# Patient Record
Sex: FEMALE | Race: BLACK OR AFRICAN AMERICAN | NOT HISPANIC OR LATINO | Employment: FULL TIME | URBAN - METROPOLITAN AREA
[De-identification: names, ages, dates, MRNs, and addresses within clinical notes are randomized per-mention and may not be internally consistent; named-entity substitution may affect disease eponyms.]

---

## 2020-02-03 ENCOUNTER — ULTRASOUND (OUTPATIENT)
Dept: OBGYN CLINIC | Age: 30
End: 2020-02-03
Payer: COMMERCIAL

## 2020-02-03 VITALS
BODY MASS INDEX: 32.58 KG/M2 | SYSTOLIC BLOOD PRESSURE: 108 MMHG | HEIGHT: 64 IN | DIASTOLIC BLOOD PRESSURE: 80 MMHG | WEIGHT: 190.8 LBS

## 2020-02-03 DIAGNOSIS — Z3A.17 17 WEEKS GESTATION OF PREGNANCY: ICD-10-CM

## 2020-02-03 DIAGNOSIS — N91.2 AMENORRHEA: Primary | ICD-10-CM

## 2020-02-03 DIAGNOSIS — Z34.90 PREGNANCY, UNSPECIFIED GESTATIONAL AGE: ICD-10-CM

## 2020-02-03 PROCEDURE — 76817 TRANSVAGINAL US OBSTETRIC: CPT | Performed by: NURSE PRACTITIONER

## 2020-02-03 NOTE — PATIENT INSTRUCTIONS
First Trimester Pregnancy   WHAT YOU NEED TO KNOW:   The first trimester of pregnancy lasts from your last period through the 13th week of pregnancy  Follow up with your healthcare provider for prenatal care  Regular prenatal care can help to keep you and your baby healthy  DISCHARGE INSTRUCTIONS:   Return to the emergency department if:   · You have pain or cramping in your abdomen or low back  · You have severe vaginal bleeding or clotting  Contact your healthcare provider or obstetrician if:   · You have light bleeding  · You have chills or a fever  · You have vaginal itching, burning, or pain  · You have yellow, green, white, or foul-smelling vaginal discharge  · You have pain or burning when you urinate, less urine than usual, or pink or bloody urine  · You have questions or concerns about your condition or care  Follow up with your healthcare provider or obstetrician as directed:  Go to all of your prenatal visits during your pregnancy  Write down your questions so you remember to ask them during your visits  Stay healthy during pregnancy:   · Take prenatal vitamins as directed  Prenatal vitamins can help you get the amount of vitamins and minerals you need during pregnancy  Prenatal vitamins may also decrease the risk of certain birth defects  · Eat a variety of healthy foods  Healthy foods include fruits, vegetables, whole-grain breads, low-fat dairy products, beans, turkey and chicken, and lean red meat  Ask your healthcare provider for more information about foods that are healthy and safe to eat during pregnancy  · Drink liquids as directed  Ask how much liquid to drink each day and which liquids are best for you  Some healthy liquids include milk, water, and juice  It is not clear how caffeine affects pregnancy  Limit your intake of caffeine to less than 200 mg each day to avoid possible health problems   Caffeine may be found in coffee, tea, cola, sports drinks, and chocolate  Do not drink alcohol  · Talk to your healthcare provider before you take medicines  Many medicines can harm your baby, especially during early pregnancy  Ask your healthcare provider before you take any medicines, including over-the-counter medicines, vitamins, herbs, or food supplements  Never use illegal or street drugs while you are pregnant  Talk to your healthcare provider if you are having trouble quitting street drugs  · Exercise  Ask your healthcare provider about the best exercise plan for you  Exercise may help you feel better and make your labor and delivery easier  · Do not smoke  Smoking can cause problems during pregnancy  It can also cause your baby to weigh less at birth  If you smoke, it is never too late to quit  Ask for information if you need help quitting  Safety tips:   · Do not use a hot tub or sauna  while you are pregnant, especially during your first trimester  Hot tubs and saunas may raise your baby's temperature and increase the risk of birth defects  It also increases your risk of miscarriage  · Protect yourself from illness  Toxoplasmosis is a disease that can cause birth defects  To protect yourself from this disease, do not clean your cat's litter box yourself  Ask someone else to clean your cat's litter box while you are pregnant  Also, do not  eat raw meat or unwashed fruits and vegetables  Wash your hands after you touch raw meat, and eat only well-cooked meat  Wash fruits and vegetables well before you eat them  © 2017 [address] [employer] [employer] Information is for End User's use only and may not be sold, redistributed or otherwise used for commercial purposes  All illustrations and images included in CareNotes® are the copyrighted property of A D A M , Inc  or Mik Hughes  The above information is an  only  It is not intended as medical advice for individual conditions or treatments   Talk to your doctor, nurse or pharmacist before following any medical regimen to see if it is safe and effective for you

## 2020-02-03 NOTE — PROGRESS NOTES
Technician: Study performed by the interpreting Certified Nurse Practitioner    Indications:  amenorrhea       Prior to use, disinfection was performed with [employer]infection Process  Probe Serial Number#  [id number])                                     LMP (after multiple dates given) last FULL period 10/5/19   (17weeks 2 days gestational age based on dates)             Procedure Details  A viable elizabeth fetus and placenta  Fetal cardiac activity is present @ 146 BPM   Patient verbalizes that she feels +FM already as well  Findings:   Viable, elizabeth intrauterine pregnancy at 17 weeks,  2 days, (no change to BRITTNY)  with a calculated BRITTNY of 2020 based on LMP  Patient instructed that she needs to go to [facility] to have formal ultrasound for proper second trimester measurements  Referral given and appt scheduled  Plan to RTO for OB Intake after ultrasound is completed first   Start PNV

## 2020-02-04 ENCOUNTER — ULTRASOUND (OUTPATIENT)
Dept: PERINATAL CARE | Facility: OTHER | Age: 30
End: 2020-02-04
Payer: COMMERCIAL

## 2020-02-04 VITALS
BODY MASS INDEX: 32.71 KG/M2 | SYSTOLIC BLOOD PRESSURE: 97 MMHG | DIASTOLIC BLOOD PRESSURE: 67 MMHG | HEIGHT: 64 IN | HEART RATE: 85 BPM | WEIGHT: 191.6 LBS

## 2020-02-04 DIAGNOSIS — Z3A.17 17 WEEKS GESTATION OF PREGNANCY: ICD-10-CM

## 2020-02-04 DIAGNOSIS — Z3A.16 16 WEEKS GESTATION OF PREGNANCY: ICD-10-CM

## 2020-02-04 DIAGNOSIS — Z36.89 ENCOUNTER FOR ULTRASOUND TO ASSESS FETAL GROWTH: Primary | ICD-10-CM

## 2020-02-04 PROBLEM — O99.210 OBESITY AFFECTING PREGNANCY: Status: ACTIVE | Noted: 2020-02-04

## 2020-02-04 PROBLEM — G43.909 MIGRAINES: Status: ACTIVE | Noted: 2020-02-04

## 2020-02-04 PROBLEM — Z87.59 HISTORY OF PRIOR PREGNANCY WITH SGA NEWBORN: Status: ACTIVE | Noted: 2020-02-04

## 2020-02-04 PROCEDURE — 76816 OB US FOLLOW-UP PER FETUS: CPT | Performed by: OBSTETRICS & GYNECOLOGY

## 2020-02-04 PROCEDURE — 99242 OFF/OP CONSLTJ NEW/EST SF 20: CPT | Performed by: OBSTETRICS & GYNECOLOGY

## 2020-02-04 RX ORDER — ASPIRIN 81 MG/1
162 TABLET, CHEWABLE ORAL DAILY
Qty: 60 TABLET | Refills: 3 | Status: SHIPPED | OUTPATIENT
Start: 2020-02-04 | End: 2020-07-09 | Stop reason: HOSPADM

## 2020-02-04 NOTE — PROGRESS NOTES
CONSULTATION: MATERNAL-FETAL MEDICINE    Dear Srini Deluca  [address],    Thank you very much for your kind referral of patient Marykay Babinski for Maternal-Fetal Medicine consultation  As you know, Ms Leopold Grippe is a 31y o  year-old [id number] at 16w4d presenting for consultation for dating ultrasound, obesity, and history of small for gestational age infant  She has reports intermittent headaches, but otherwise has no complaints       Her Antepartum course is significant for:   Patient Active Problem List   Diagnosis    History of prior pregnancy with SGA     Migraines    16 weeks gestation of pregnancy    Obesity affecting pregnancy       Obstetric History:  OB History    Para Term  AB Living   10 4 4   4 4   SAB TAB Ectopic Multiple Live Births     4            # Outcome Date GA Lbr Shay/2nd Weight Sex Delivery Anes PTL Lv   10 Current            9             8 TAB            7 TAB            6 TAB            5 TAB            4 Term            3 Term            2 Term            1 Term               Obstetric Comments   4    4 Terminations       Past Medical History:  Past Medical History:   Diagnosis Date    Migraine     Obesity        Past Surgical History:  History reviewed  No pertinent surgical history  Social History:   She denies current use of alcohol, drugs of abuse, tobacco, and marijuana products  She is a former smoker  Family History:  Family history was reviewed using an office screening tool, and is negative for congenital anomalies, genetic diseases, and thromboembolism in first degree relatives of this pregnancy  Family history is notable for diabetes and hypertension on her side of the family      Medications:    Current Outpatient Medications:     aspirin 81 mg chewable tablet, Chew 2 tablets (162 mg total) daily, Disp: 60 tablet, Rfl: 3    Allergies:  No Known Allergies    Review of Systems:  Pertinent items are noted in HPI  Exam:  Vitals: Blood pressure 97/67, pulse 85, height 5' 4" (1 626 m), weight 86 9 kg (191 lb 9 6 oz), last menstrual period 10/05/2019  General appearance: alert, well appearing, and in no distress  The remainder of her physical examination was deferred as she was here today for consultation and discussion  Ultrasound findings today are as follows: Her last menstrual period is uncertain  On today's ultrasound, a viable elizabeth intrauterine pregnancy is seen, measuring 16w4d, establishing an EDC of 2020  The patient was advised of her EDC, and medical record was updated  Uterus, cervix, adnexa, and cul-de-sac appear within normal limits  My recommendations are as follows:     Obesity affecting pregnancy  Obesity in pregnancy (defined as body mass index > 30 kg/m2) is associated with an increased risk of several pregnancy complications, including hypertensive disorders, diabetes, abnormal fetal growth, fetal malformations  The risk of  delivery is also increased, as are wound complications in the event of  delivery  A healthy diet and exercise, as well as appropriate gestational weight gain (no more than 11-20 pounds) can help reduce risk of these complications  150 minutes of moderate exercise per week is recommended for all pregnant women  Nutrition counseling is also available if desired  Early screening for gestational diabetes is recommended, as well as routine re-screening at 24-28 weeks if early screening results are normal   fetal surveillance is also recommended as follows:  BMI 30-40:  Evaluation of fetal growth at 32 weeks gestation  BMI >40: Evaluation of fetal growth at 28, 34 weeks gestation, as well as antepartum fetal surveillance once weekly beginning at 36 weeks gestation      History of prior pregnancy with SGA   Given history of small for gestational age (SGA)  I recommend a third trimester growth ultrasound at 30-32 weeks gestation  16 weeks gestation of pregnancy  I recommend that she return at for detailed anatomic survey as well as transvaginal cervical length screening 20 weeks gestation  She was advised of EDC established by today's ultrasound  We reviewed genetic screening and testing options and she would like to pursue a quad screen, which I instructed her to speak with her OB to have ordered  She did not wish to pursue amniocentesis at this time  The use of low dose aspirin in pregnancy (81-162mg) is recommended in women with a high risk, or multiple moderate risk factors for preeclampsia  Aspirin therapy should be initiated between 12-28 weeks gestation, and is most effective if started prior to 16 weeks gestation, and continued until delivery  Low dose aspirin in pregnancy has been shown to reduce the incidence of preeclampsia in women with risk factors, and has been shown to be safe and without significant maternal or fetal risk  In light of her risk factors which include obesity and  ethnicity, I recommend initiating aspirin therapy  She has an OB visit to follow and I encouraged her to discuss management of headache at her prenatal visit  We briefly reviewed over-the-counter treatment options, and importance of avoiding NSAIDs in pregnancy  Evaluation and Management:  The patient was counseled regarding the above findings  The limitations of ultrasound were reviewed  The approximate face-to-face time was 30 minutes  The majority of time (>50%) was spent counseling and/or coordinating care with the patient and/or family members  At the conclusion of today's encounter, all questions were answered to her satisfaction  Thank you very much for this kind referral and please do not hesitate to contact me with any further questions or concerns      Sincerely,    Mikael Boyle MD  Attending Physician, [address] [place] [address] [facility]

## 2020-02-04 NOTE — ASSESSMENT & PLAN NOTE
Obesity in pregnancy (defined as body mass index > 30 kg/m2) is associated with an increased risk of several pregnancy complications, including hypertensive disorders, diabetes, abnormal fetal growth, fetal malformations  The risk of  delivery is also increased, as are wound complications in the event of  delivery  A healthy diet and exercise, as well as appropriate gestational weight gain (no more than 11-20 pounds) can help reduce risk of these complications  150 minutes of moderate exercise per week is recommended for all pregnant women  Nutrition counseling is also available if desired  Early screening for gestational diabetes is recommended, as well as routine re-screening at 24-28 weeks if early screening results are normal   fetal surveillance is also recommended as follows:  BMI 30-40:  Evaluation of fetal growth at 32 weeks gestation  BMI >40: Evaluation of fetal growth at 28, 34 weeks gestation, as well as antepartum fetal surveillance once weekly beginning at 36 weeks gestation

## 2020-02-04 NOTE — LETTER
2020     Silas Robles, [address] [facility] [address]    Patient: Baldomero Meza   YOB: 1990   Date of Visit: 2020       Dear Dr Gorman Daughters: Thank you for referring Baldomero Meza to me for evaluation  Below are my notes for this consultation  If you have questions, please do not hesitate to call me  I look forward to following your patient along with you  Sincerely,        Yaw Britton MD        CC: No Recipients  Yaw Britton MD  2020  3:45 PM  Sign at close encounter  CONSULTATION: MATERNAL-FETAL MEDICINE    Dear Silas Robles, Srini  [address],    Thank you very much for your kind referral of patient Baldomero Meza for Maternal-Fetal Medicine consultation  As you know, Ms Mariana Gill is a 31y o  year-old [id number] at 16w4d presenting for consultation for dating ultrasound, obesity, and history of small for gestational age infant  She has reports intermittent headaches, but otherwise has no complaints       Her Antepartum course is significant for:   Patient Active Problem List   Diagnosis    History of prior pregnancy with SGA     Migraines    16 weeks gestation of pregnancy    Obesity affecting pregnancy       Obstetric History:  OB History    Para Term  AB Living   10 4 4   4 4   SAB TAB Ectopic Multiple Live Births     4            # Outcome Date GA Lbr Shay/2nd Weight Sex Delivery Anes PTL Lv   10 Current            9             8 TAB            7 TAB            6 TAB            5 TAB            4 Term            3 Term            2 Term            1 Term               Obstetric Comments   4    4 Terminations       Past Medical History:  Past Medical History:   Diagnosis Date    Migraine     Obesity        Past Surgical History:  History reviewed  No pertinent surgical history      Social History:   She denies current use of alcohol, drugs of abuse, tobacco, and marijuana products  She is a former smoker  Family History:  Family history was reviewed using an office screening tool, and is negative for congenital anomalies, genetic diseases, and thromboembolism in first degree relatives of this pregnancy  Family history is notable for diabetes and hypertension on her side of the family  Medications:    Current Outpatient Medications:     aspirin 81 mg chewable tablet, Chew 2 tablets (162 mg total) daily, Disp: 60 tablet, Rfl: 3    Allergies:  No Known Allergies    Review of Systems:  Pertinent items are noted in HPI  Exam:  Vitals: Blood pressure 97/67, pulse 85, height 5' 4" (1 626 m), weight 86 9 kg (191 lb 9 6 oz), last menstrual period 10/05/2019  General appearance: alert, well appearing, and in no distress  The remainder of her physical examination was deferred as she was here today for consultation and discussion  Ultrasound findings today are as follows: Her last menstrual period is uncertain  On today's ultrasound, a viable elizabeth intrauterine pregnancy is seen, measuring 16w4d, establishing an EDC of 2020  The patient was advised of her EDC, and medical record was updated  Uterus, cervix, adnexa, and cul-de-sac appear within normal limits  My recommendations are as follows:     Obesity affecting pregnancy  Obesity in pregnancy (defined as body mass index > 30 kg/m2) is associated with an increased risk of several pregnancy complications, including hypertensive disorders, diabetes, abnormal fetal growth, fetal malformations  The risk of  delivery is also increased, as are wound complications in the event of  delivery  A healthy diet and exercise, as well as appropriate gestational weight gain (no more than 11-20 pounds) can help reduce risk of these complications  150 minutes of moderate exercise per week is recommended for all pregnant women  Nutrition counseling is also available if desired  Early screening for gestational diabetes is recommended, as well as routine re-screening at 24-28 weeks if early screening results are normal   fetal surveillance is also recommended as follows:  BMI 30-40:  Evaluation of fetal growth at 32 weeks gestation  BMI >40: Evaluation of fetal growth at 28, 34 weeks gestation, as well as antepartum fetal surveillance once weekly beginning at 36 weeks gestation  History of prior pregnancy with SGA   Given history of small for gestational age (SGA)  I recommend a third trimester growth ultrasound at 30-32 weeks gestation  16 weeks gestation of pregnancy  I recommend that she return at for detailed anatomic survey as well as transvaginal cervical length screening 20 weeks gestation  She was advised of EDC established by today's ultrasound  We reviewed genetic screening and testing options and she would like to pursue a quad screen, which I instructed her to speak with her OB to have ordered  She did not wish to pursue amniocentesis at this time  The use of low dose aspirin in pregnancy (81-162mg) is recommended in women with a high risk, or multiple moderate risk factors for preeclampsia  Aspirin therapy should be initiated between 12-28 weeks gestation, and is most effective if started prior to 16 weeks gestation, and continued until delivery  Low dose aspirin in pregnancy has been shown to reduce the incidence of preeclampsia in women with risk factors, and has been shown to be safe and without significant maternal or fetal risk  In light of her risk factors which include obesity and  ethnicity, I recommend initiating aspirin therapy  She has an OB visit to follow and I encouraged her to discuss management of headache at her prenatal visit  We briefly reviewed over-the-counter treatment options, and importance of avoiding NSAIDs in pregnancy         Evaluation and Management:  The patient was counseled regarding the above findings  The limitations of ultrasound were reviewed  The approximate face-to-face time was 30 minutes  The majority of time (>50%) was spent counseling and/or coordinating care with the patient and/or family members  At the conclusion of today's encounter, all questions were answered to her satisfaction  Thank you very much for this kind referral and please do not hesitate to contact me with any further questions or concerns      Sincerely,    Anupam Faustin MD  Attending Physician, [place]

## 2020-02-04 NOTE — ASSESSMENT & PLAN NOTE
I recommend that she return at for detailed anatomic survey as well as transvaginal cervical length screening 20 weeks gestation  She was advised of EDC established by today's ultrasound  We reviewed genetic screening and testing options and she would like to pursue a quad screen, which I instructed her to speak with her OB to have ordered  She did not wish to pursue amniocentesis at this time  The use of low dose aspirin in pregnancy (81-162mg) is recommended in women with a high risk, or multiple moderate risk factors for preeclampsia  Aspirin therapy should be initiated between 12-28 weeks gestation, and is most effective if started prior to 16 weeks gestation, and continued until delivery  Low dose aspirin in pregnancy has been shown to reduce the incidence of preeclampsia in women with risk factors, and has been shown to be safe and without significant maternal or fetal risk  In light of her risk factors which include obesity and  ethnicity, I recommend initiating aspirin therapy  She has an OB visit to follow and I encouraged her to discuss management of headache at her prenatal visit  We briefly reviewed over-the-counter treatment options, and importance of avoiding NSAIDs in pregnancy

## 2020-02-04 NOTE — PATIENT INSTRUCTIONS
The use of low dose aspirin in pregnancy (162mg, which is 2 tabs of baby aspirin) is recommended in women with a high risk, or multiple moderate risk factors for preeclampsia  Aspirin therapy should be initiated between 12-28 weeks gestation, and is most effective if started prior to 16 weeks gestation, and continued until delivery  Low dose aspirin in pregnancy has been shown to reduce the incidence of preeclampsia in women with risk factors, and has been shown to be safe and without significant maternal or fetal risk  In light of your risk factors for preeclampsia, including: Body Mass Index 30 or greater and  Race I recommend initiating aspirin therapy, which was prescribed today

## 2020-02-04 NOTE — ASSESSMENT & PLAN NOTE
Given history of small for gestational age (SGA)  I recommend a third trimester growth ultrasound at 30-32 weeks gestation

## 2020-03-03 PROBLEM — Z3A.20 20 WEEKS GESTATION OF PREGNANCY: Status: ACTIVE | Noted: 2020-02-04

## 2020-06-04 ENCOUNTER — OB ABSTRACT (OUTPATIENT)
Dept: OBGYN CLINIC | Facility: CLINIC | Age: 30
End: 2020-06-04

## 2020-06-04 ENCOUNTER — TELEPHONE (OUTPATIENT)
Dept: OBGYN CLINIC | Facility: CLINIC | Age: 30
End: 2020-06-04

## 2020-06-12 ENCOUNTER — TELEPHONE (OUTPATIENT)
Dept: OBGYN CLINIC | Facility: CLINIC | Age: 30
End: 2020-06-12

## 2020-06-19 ENCOUNTER — TELEPHONE (OUTPATIENT)
Dept: OBGYN CLINIC | Facility: CLINIC | Age: 30
End: 2020-06-19

## 2020-06-19 ENCOUNTER — INITIAL PRENATAL (OUTPATIENT)
Dept: OBGYN CLINIC | Facility: CLINIC | Age: 30
End: 2020-06-19

## 2020-06-19 ENCOUNTER — TELEPHONE (OUTPATIENT)
Dept: PERINATAL CARE | Facility: CLINIC | Age: 30
End: 2020-06-19

## 2020-06-19 ENCOUNTER — DOCUMENTATION (OUTPATIENT)
Dept: OBGYN CLINIC | Facility: CLINIC | Age: 30
End: 2020-06-19

## 2020-06-19 VITALS
WEIGHT: 184.8 LBS | BODY MASS INDEX: 32.74 KG/M2 | RESPIRATION RATE: 18 BRPM | DIASTOLIC BLOOD PRESSURE: 72 MMHG | HEIGHT: 63 IN | HEART RATE: 74 BPM | SYSTOLIC BLOOD PRESSURE: 122 MMHG

## 2020-06-19 DIAGNOSIS — Z33.1 INCIDENTAL PREGNANCY: Primary | ICD-10-CM

## 2020-06-19 PROCEDURE — OBC

## 2020-06-22 ENCOUNTER — TELEPHONE (OUTPATIENT)
Dept: PERINATAL CARE | Facility: CLINIC | Age: 30
End: 2020-06-22

## 2020-06-24 ENCOUNTER — OB ABSTRACT (OUTPATIENT)
Dept: OBGYN CLINIC | Facility: CLINIC | Age: 30
End: 2020-06-24

## 2020-07-01 ENCOUNTER — TELEPHONE (OUTPATIENT)
Dept: PERINATAL CARE | Facility: CLINIC | Age: 30
End: 2020-07-01

## 2020-07-01 ENCOUNTER — HOSPITAL ENCOUNTER (OUTPATIENT)
Facility: HOSPITAL | Age: 30
Discharge: HOME | End: 2020-07-02
Attending: OBSTETRICS & GYNECOLOGY | Admitting: OBSTETRICS & GYNECOLOGY
Payer: COMMERCIAL

## 2020-07-01 VITALS
RESPIRATION RATE: 18 BRPM | HEART RATE: 93 BPM | DIASTOLIC BLOOD PRESSURE: 70 MMHG | TEMPERATURE: 98.4 F | SYSTOLIC BLOOD PRESSURE: 118 MMHG

## 2020-07-01 PROBLEM — Z3A.38 38 WEEKS GESTATION OF PREGNANCY: Status: ACTIVE | Noted: 2020-02-04

## 2020-07-01 PROBLEM — Z3A.37 PREGNANCY WITH 37 WEEKS COMPLETED GESTATION: Status: ACTIVE | Noted: 2020-07-01

## 2020-07-01 PROCEDURE — 87340 HEPATITIS B SURFACE AG IA: CPT | Performed by: STUDENT IN AN ORGANIZED HEALTH CARE EDUCATION/TRAINING PROGRAM

## 2020-07-01 PROCEDURE — 86592 SYPHILIS TEST NON-TREP QUAL: CPT | Performed by: STUDENT IN AN ORGANIZED HEALTH CARE EDUCATION/TRAINING PROGRAM

## 2020-07-01 PROCEDURE — 86762 RUBELLA ANTIBODY: CPT | Performed by: STUDENT IN AN ORGANIZED HEALTH CARE EDUCATION/TRAINING PROGRAM

## 2020-07-01 PROCEDURE — 99213 OFFICE O/P EST LOW 20 MIN: CPT | Performed by: OBSTETRICS & GYNECOLOGY

## 2020-07-01 PROCEDURE — 87389 HIV-1 AG W/HIV-1&-2 AB AG IA: CPT | Performed by: STUDENT IN AN ORGANIZED HEALTH CARE EDUCATION/TRAINING PROGRAM

## 2020-07-01 PROCEDURE — 87086 URINE CULTURE/COLONY COUNT: CPT | Performed by: STUDENT IN AN ORGANIZED HEALTH CARE EDUCATION/TRAINING PROGRAM

## 2020-07-01 PROCEDURE — 85025 COMPLETE CBC W/AUTO DIFF WBC: CPT | Performed by: STUDENT IN AN ORGANIZED HEALTH CARE EDUCATION/TRAINING PROGRAM

## 2020-07-01 PROCEDURE — 81001 URINALYSIS AUTO W/SCOPE: CPT | Performed by: STUDENT IN AN ORGANIZED HEALTH CARE EDUCATION/TRAINING PROGRAM

## 2020-07-01 NOTE — TELEPHONE ENCOUNTER
-------------------------------------------------------------    Attempted to reach patient by phone and left voicemail to confirm appointment for MFM ultrasound  1 support person ( must be over the age of 15) may accompany you for your appointment  You must wear a mask (covering nose and mouth) during your visit  You and your support person will be screened upon arrival   IF not feeling well- cough, fever, shortness of breath or any flu like symptoms contact your primary care physician or [address] [place] [address] [place]  Please call our office prior to entering the building  Check in and rooming questions will be done via phone  Inside office # provided:  [place] line: [phone]  [place] line:  [phone]  [place] line:  [address] line:  [phone]  [place] line:  [phone]  [place] line:  [phone]    [facility] does not allow cell phone use, recording device or streaming during ultrasound     Any questions with these instructions please call Maternal Fetal Medicine nurse line today @ # [phone]

## 2020-07-02 LAB
BACTERIA UR QL AUTO: ABNORMAL /HPF
BASOPHILS # BLD AUTO: 0.01 THOUSANDS/ΜL (ref 0–0.1)
BASOPHILS NFR BLD AUTO: 0 % (ref 0–1)
BILIRUB UR QL STRIP: NEGATIVE
CLARITY UR: CLEAR
COLOR UR: YELLOW
EOSINOPHIL # BLD AUTO: 0.02 THOUSAND/ΜL (ref 0–0.61)
EOSINOPHIL NFR BLD AUTO: 0 % (ref 0–6)
ERYTHROCYTE [DISTWIDTH] IN BLOOD BY AUTOMATED COUNT: 15.8 % (ref 11.6–15.1)
GLUCOSE UR STRIP-MCNC: NEGATIVE MG/DL
HBV SURFACE AG SER QL: NORMAL
HCT VFR BLD AUTO: 26.9 % (ref 34.8–46.1)
HGB BLD-MCNC: 8.6 G/DL (ref 11.5–15.4)
HGB UR QL STRIP.AUTO: ABNORMAL
IMM GRANULOCYTES # BLD AUTO: 0.01 THOUSAND/UL (ref 0–0.2)
IMM GRANULOCYTES NFR BLD AUTO: 0 % (ref 0–2)
KETONES UR STRIP-MCNC: NEGATIVE MG/DL
LEUKOCYTE ESTERASE UR QL STRIP: ABNORMAL
LYMPHOCYTES # BLD AUTO: 1.93 THOUSANDS/ΜL (ref 0.6–4.47)
LYMPHOCYTES NFR BLD AUTO: 43 % (ref 14–44)
MCH RBC QN AUTO: 26.9 PG (ref 26.8–34.3)
MCHC RBC AUTO-ENTMCNC: 32 G/DL (ref 31.4–37.4)
MCV RBC AUTO: 84 FL (ref 82–98)
MONOCYTES # BLD AUTO: 0.32 THOUSAND/ΜL (ref 0.17–1.22)
MONOCYTES NFR BLD AUTO: 7 % (ref 4–12)
MUCOUS THREADS UR QL AUTO: ABNORMAL
NEUTROPHILS # BLD AUTO: 2.23 THOUSANDS/ΜL (ref 1.85–7.62)
NEUTS SEG NFR BLD AUTO: 50 % (ref 43–75)
NITRITE UR QL STRIP: NEGATIVE
NON-SQ EPI CELLS URNS QL MICRO: ABNORMAL /HPF
NRBC BLD AUTO-RTO: 0 /100 WBCS
PH UR STRIP.AUTO: 8 [PH]
PLATELET # BLD AUTO: 180 THOUSANDS/UL (ref 149–390)
PMV BLD AUTO: 10.8 FL (ref 8.9–12.7)
PROT UR STRIP-MCNC: NEGATIVE MG/DL
RBC # BLD AUTO: 3.2 MILLION/UL (ref 3.81–5.12)
RBC #/AREA URNS AUTO: ABNORMAL /HPF
RPR SER QL: NORMAL
RUBV IGG SERPL IA-ACNC: 12.9 IU/ML
SP GR UR STRIP.AUTO: 1.02 (ref 1–1.03)
UROBILINOGEN UR QL STRIP.AUTO: 1 E.U./DL
WBC # BLD AUTO: 4.52 THOUSAND/UL (ref 4.31–10.16)
WBC #/AREA URNS AUTO: ABNORMAL /HPF

## 2020-07-02 PROCEDURE — 99213 OFFICE O/P EST LOW 20 MIN: CPT

## 2020-07-02 NOTE — PROGRESS NOTES
L&D Triage Note - OB/GYN  Adele Bush 31 y o  female MRN: [id number]  Unit/Bed#: LD TRIAGE  Encounter: [id number]    Patient is seen by Dr Gladys Holguin    ASSESSMENT:  Adele Bush is a 31 y o  [id number] at 37w5d who presents with abd cramping and leakage of fluid  PLAN:  1  R/o labor  - SVE 0/ 50% / -4  - [place] - Irregular ctx's with irritability    2  R/o rupture  - Speculum exam showing normal white discharge, no pooling  - Nitrazine (-)  - Dry slide with no ferning  - NISA 15 vtx    3  37 weeks gestation  - Poor PNC to this point - cites COVID pandemic as the reason  - Will draw prenatal panel now  - Scheduled for US tomorrow at PN center  - Encouraged to follow up with Dr Clau Crawford office in the next week for PN visit  - Needs GBS collected    Disposition  - Labor/ROM ruled out  - Discharge from [facility] triage with term labor precautions  Reviewed rupture of membranes, false vs true labor, decreased fetal movement, and vaginal bleeding  - Pt to call provider with any concerns and follow up at her next scheduled prenatal appointment   - Continue routine prenatal care   - Case discussed with Dr Cj Ramirez:  Adele Bush is a 31 y o  [id number] at 37w5d with an Estimated Date of Delivery: 20 here today for complaints of loss of fluid  She reports this morning at around 8am she felt gush of clear odorless fluid when sitting on toilet  She states around this time she began feeling 8/10 abd cramping in her lower abdomen/back every 20-30 min  Contractions: Yes - reported as above  Leakage of fluid: yes - reported as above  Vaginal Bleeding: no  Fetal movement: present    Her obstetrical history is significant for:  - 4 prior term , uncomplicated pregnancies   4 prior TAB    OBJECTIVE:  Vitals:    20 2203   BP: 118/70   Pulse: 93   Resp: 18   Temp: 98 4 °F (36 9 °C)       ROS:  Constitutional: Negative for fever, chills  Respiratory: Negative for cough, SOB  Cardiovascular: Negative for chest pain    Gastrointestinal: Negative for nausea/vomiting    General Physical Exam:  General: NAD, cooperative, pleasant, comfortable  Cardiovascular: regular rate, normotensive  Lungs: non-labored breathing  Abdomen: Soft, nontender  Gravid uterus  Lower extremeties: nontender    Cervical Exam  Speculum: Cervical os is closed on visualization  No evidence of bleeding or pooling of fluid  Normal physiologic discharge present  SVE: 0 / 50% / -4    Fetal monitoring:  FHT:  135 bpm/ Moderate 6 - 25 bpm / accelerations present, no decelerations  [place]: irregular with irritability     Membrane status   - No ferning   - Negative nitrazene   - No pooling     Imaging:   Abd  US   NISA - performed by Dr Zena Camacho     - Total: 15 cm    Toni Rivera DO  PGY-1, [facility]  7/1/2020 10:15 PM     Patient seen and examined with resident agree

## 2020-07-03 LAB
BACTERIA UR CULT: ABNORMAL
BACTERIA UR CULT: ABNORMAL
HIV 1+2 AB+HIV1 P24 AG SERPL QL IA: NORMAL

## 2020-07-07 ENCOUNTER — HOSPITAL ENCOUNTER (INPATIENT)
Facility: HOSPITAL | Age: 30
LOS: 2 days | Discharge: HOME | End: 2020-07-09
Attending: OBSTETRICS & GYNECOLOGY | Admitting: OBSTETRICS & GYNECOLOGY
Payer: COMMERCIAL

## 2020-07-07 DIAGNOSIS — Z3A.38 38 WEEKS GESTATION OF PREGNANCY: Primary | ICD-10-CM

## 2020-07-07 LAB
ABO GROUP BLD: NORMAL
ABO GROUP BLD: NORMAL
AMPHETAMINES SERPL QL SCN: POSITIVE
BARBITURATES UR QL: NEGATIVE
BASE EXCESS BLDCOA CALC-SCNC: -4.1 MMOL/L (ref 3–11)
BASE EXCESS BLDCOV CALC-SCNC: -3.8 MMOL/L (ref 1–9)
BENZODIAZ UR QL: NEGATIVE
BLD GP AB SCN SERPL QL: NEGATIVE
COCAINE UR QL: NEGATIVE
ERYTHROCYTE [DISTWIDTH] IN BLOOD BY AUTOMATED COUNT: 16.2 % (ref 11.6–15.1)
EXTERNAL GROUP B STREP ANTIGEN: NORMAL
HCO3 BLDCOA-SCNC: 21.9 MMOL/L (ref 17.3–27.3)
HCO3 BLDCOV-SCNC: 21.6 MMOL/L (ref 12.2–28.6)
HCT VFR BLD AUTO: 29.9 % (ref 34.8–46.1)
HGB BLD-MCNC: 9.7 G/DL (ref 11.5–15.4)
MCH RBC QN AUTO: 26.9 PG (ref 26.8–34.3)
MCHC RBC AUTO-ENTMCNC: 32.4 G/DL (ref 31.4–37.4)
MCV RBC AUTO: 83 FL (ref 82–98)
METHADONE UR QL: NEGATIVE
O2 CT VFR BLDCOA CALC: 2.5 ML/DL
OPIATES UR QL SCN: NEGATIVE
OXYCODONE+OXYMORPHONE UR QL SCN: NEGATIVE
OXYHGB MFR BLDCOA: 15.7 %
OXYHGB MFR BLDCOV: 22.5 %
PCO2 BLDCOA: 43.4 MM[HG] (ref 30–60)
PCO2 BLDCOV: 40.4 MM HG (ref 27–43)
PCP UR QL: NEGATIVE
PH BLDCOA: 7.32 [PH] (ref 7.23–7.43)
PH BLDCOV: 7.35 [PH] (ref 7.19–7.49)
PLATELET # BLD AUTO: 195 THOUSANDS/UL (ref 149–390)
PMV BLD AUTO: 10.7 FL (ref 8.9–12.7)
PO2 BLDCOA: 11.7 MM HG (ref 5–25)
PO2 BLDCOV: 12.3 MM HG (ref 15–45)
RBC # BLD AUTO: 3.6 MILLION/UL (ref 3.81–5.12)
RH BLD: POSITIVE
RH BLD: POSITIVE
SAO2 % BLDCOV: 3.7 ML/DL
SPECIMEN EXPIRATION DATE: NORMAL
THC UR QL: NEGATIVE
WBC # BLD AUTO: 5.61 THOUSAND/UL (ref 4.31–10.16)

## 2020-07-07 PROCEDURE — 86850 RBC ANTIBODY SCREEN: CPT | Performed by: OBSTETRICS & GYNECOLOGY

## 2020-07-07 PROCEDURE — 86592 SYPHILIS TEST NON-TREP QUAL: CPT | Performed by: OBSTETRICS & GYNECOLOGY

## 2020-07-07 PROCEDURE — 4A1HXCZ MONITORING OF PRODUCTS OF CONCEPTION, CARDIAC RATE, EXTERNAL APPROACH: ICD-10-PCS | Performed by: OBSTETRICS & GYNECOLOGY

## 2020-07-07 PROCEDURE — 88307 TISSUE EXAM BY PATHOLOGIST: CPT | Performed by: PATHOLOGY

## 2020-07-07 PROCEDURE — 59410 OBSTETRICAL CARE: CPT | Performed by: OBSTETRICS & GYNECOLOGY

## 2020-07-07 PROCEDURE — 82805 BLOOD GASES W/O2 SATURATION: CPT | Performed by: OBSTETRICS & GYNECOLOGY

## 2020-07-07 PROCEDURE — 86901 BLOOD TYPING SEROLOGIC RH(D): CPT | Performed by: OBSTETRICS & GYNECOLOGY

## 2020-07-07 PROCEDURE — 85027 COMPLETE CBC AUTOMATED: CPT | Performed by: OBSTETRICS & GYNECOLOGY

## 2020-07-07 PROCEDURE — 99024 POSTOP FOLLOW-UP VISIT: CPT | Performed by: OBSTETRICS & GYNECOLOGY

## 2020-07-07 PROCEDURE — 86900 BLOOD TYPING SEROLOGIC ABO: CPT | Performed by: OBSTETRICS & GYNECOLOGY

## 2020-07-07 PROCEDURE — 80307 DRUG TEST PRSMV CHEM ANLYZR: CPT | Performed by: OBSTETRICS & GYNECOLOGY

## 2020-07-07 RX ORDER — ONDANSETRON 2 MG/ML
4 INJECTION INTRAMUSCULAR; INTRAVENOUS EVERY 8 HOURS PRN
Status: DISCONTINUED | OUTPATIENT
Start: 2020-07-07 | End: 2020-07-09 | Stop reason: HOSPADM

## 2020-07-07 RX ORDER — OXYTOCIN 10 [USP'U]/ML
10 INJECTION, SOLUTION INTRAMUSCULAR; INTRAVENOUS ONCE
Status: COMPLETED | OUTPATIENT
Start: 2020-07-07 | End: 2020-07-07

## 2020-07-07 RX ORDER — DIAPER,BRIEF,INFANT-TODD,DISP
1 EACH MISCELLANEOUS 4 TIMES DAILY PRN
Status: DISCONTINUED | OUTPATIENT
Start: 2020-07-07 | End: 2020-07-09 | Stop reason: HOSPADM

## 2020-07-07 RX ORDER — OXYTOCIN 10 [USP'U]/ML
INJECTION, SOLUTION INTRAMUSCULAR; INTRAVENOUS
Status: COMPLETED
Start: 2020-07-07 | End: 2020-07-07

## 2020-07-07 RX ORDER — DIPHENHYDRAMINE HCL 25 MG
25 TABLET ORAL EVERY 6 HOURS PRN
Status: DISCONTINUED | OUTPATIENT
Start: 2020-07-07 | End: 2020-07-09 | Stop reason: HOSPADM

## 2020-07-07 RX ORDER — IBUPROFEN 600 MG/1
600 TABLET ORAL EVERY 6 HOURS PRN
Status: DISCONTINUED | OUTPATIENT
Start: 2020-07-07 | End: 2020-07-09 | Stop reason: HOSPADM

## 2020-07-07 RX ORDER — ONDANSETRON 2 MG/ML
4 INJECTION INTRAMUSCULAR; INTRAVENOUS EVERY 6 HOURS PRN
Status: DISCONTINUED | OUTPATIENT
Start: 2020-07-07 | End: 2020-07-07

## 2020-07-07 RX ORDER — DOCUSATE SODIUM 100 MG/1
100 CAPSULE, LIQUID FILLED ORAL 2 TIMES DAILY
Status: DISCONTINUED | OUTPATIENT
Start: 2020-07-07 | End: 2020-07-09 | Stop reason: HOSPADM

## 2020-07-07 RX ORDER — OXYTOCIN/RINGER'S LACTATE 30/500 ML
62.5 PLASTIC BAG, INJECTION (ML) INTRAVENOUS
Status: DISCONTINUED | OUTPATIENT
Start: 2020-07-07 | End: 2020-07-07

## 2020-07-07 RX ORDER — IBUPROFEN 600 MG/1
600 TABLET ORAL EVERY 6 HOURS PRN
Status: DISCONTINUED | OUTPATIENT
Start: 2020-07-07 | End: 2020-07-07

## 2020-07-07 RX ORDER — ACETAMINOPHEN 325 MG/1
650 TABLET ORAL EVERY 4 HOURS PRN
Status: DISCONTINUED | OUTPATIENT
Start: 2020-07-07 | End: 2020-07-09 | Stop reason: HOSPADM

## 2020-07-07 RX ORDER — CALCIUM CARBONATE 200(500)MG
1000 TABLET,CHEWABLE ORAL DAILY PRN
Status: DISCONTINUED | OUTPATIENT
Start: 2020-07-07 | End: 2020-07-09 | Stop reason: HOSPADM

## 2020-07-07 RX ADMIN — IBUPROFEN 600 MG: 600 TABLET ORAL at 07:46

## 2020-07-07 RX ADMIN — IBUPROFEN 600 MG: 600 TABLET ORAL at 15:23

## 2020-07-07 RX ADMIN — OXYTOCIN 10 UNITS: 10 INJECTION, SOLUTION INTRAMUSCULAR; INTRAVENOUS at 06:52

## 2020-07-07 RX ADMIN — IBUPROFEN 600 MG: 600 TABLET ORAL at 22:32

## 2020-07-07 RX ADMIN — DOCUSATE SODIUM 100 MG: 100 CAPSULE, LIQUID FILLED ORAL at 17:47

## 2020-07-07 NOTE — L&D DELIVERY NOTE
Delivery Summary - OB/GYN   Laural Sida 31 y o  female MRN: [id number]  Unit/Bed#: -01 Encounter: [id number]    Pre-delivery Diagnosis:   1  38w4d pregnancy  2  Poor prenatal care    Post-delivery Diagnosis: same, delivered    Attending: Natalie Deleon MD    Assistant(s): Ivelisse BAIG    Procedure:     Anesthesia: None    EBL: 350 cc    Specimens:   1  Arterial and venous cord gases  2  Cord blood  3  Segment of umbilical cord  4  Placenta to pathology secondary to thick meconium    Complications:  None apparent    Findings:  1  Viable male  delivered on 20 at 55222 weighing 6lbs 6oz;  Apgar scores of 9 at one minute and 9 at five minutes  2  Spontaneous delivery of placenta with centrally inserted 3-vessel cord  3  Arterial and venous cord gas of 7320 and 7 346, respectively         Disposition: Patient tolerated the procedure well and was recovering in labor and delivery room with family and  before being transferred to the post-partum floor  Procedure Details     Description of procedure     On 20 at 0648 patient delivered a viable male , weighing 2915g, Apgars of 9 (1 min) and 9  (5 min)  The fetal vertex delivered spontaneously  There was no nuchal cord  The anterior shoulder delivered atraumatically with maternal expulsive forces and the assistance of downward traction  The posterior shoulder delivered with maternal expulsive forces and the assistance of upward traction  The remainder of the fetus delivered spontaneously  Upon delivery, the infant was placed on the mothers abdomen and the cord was clamped and cut  The infant was noted to cry spontaneously and was moving all extremities appropriately  There was no evidence for injury  Awaiting nurse resuscitators evaluated the  at bedside  Arterial and venous cord blood gases and cord blood was collected for analysis  These were promptly sent to the lab   In the immediate post-partum, 30 units of IV pitocin was administered and the uterus was noted to contract down well with massage and pitocin  The placenta delivered spontaneously at 3684 and was noted to have a centrally inserted 3 vessel cord  The vagina, cervix, and perineum were inspected and there was noted to be no lacerations  At the conclusion of the delivery, all needle, sponge, and instrument counts were noted to be correct  Patient tolerated the procedure well and was allowed to recover in labor and delivery room with family and  before being transferred to the post-partum floor  Dr Hermes Brizuela was present and participated in all key portions of the case      [place], DO

## 2020-07-07 NOTE — LACTATION NOTE
This note was copied from a baby's chart  CONSULT - LACTATION  Baby Girl Mik Peace) Rawles 0 days female MRN: [id number]    [address] [place] [address] Room / Bed: (N)/(N) Encounter: [id number]    Maternal Information     MOTHER:  Dwaine Shore  Maternal Age: 31 y o    OB History: #: 1, Date: 2011, Sex: None, Weight: None, GA: 20w0d, Delivery: None, Apgar1: None, Apgar5: None, Living: None, Birth Comments: None    #: 2, Date: 03/12/12, Sex: Female, Weight: 2268 g (5 lb), GA: 40w0d, Delivery: Vaginal, Spontaneous, Apgar1: None, Apgar5: None, Living: Living, Birth Comments: None    #: 3, Date: 10/27/14, Sex: Female, Weight: 2722 g (6 lb), GA: 40w0d, Delivery: Vaginal, Spontaneous, Apgar1: None, Apgar5: None, Living: Living, Birth Comments: induced  due to ovarian cyst    #: 4, Date: 02/29/16, Sex: Male, Weight: 3175 g (7 lb), GA: 40w0d, Delivery: Vaginal, Spontaneous, Apgar1: None, Apgar5: None, Living: Living, Birth Comments: None    #: 5, Date: 04/25/17, Sex: Female, Weight: 3629 g (8 lb), GA: 40w0d, Delivery: Vaginal, Spontaneous, Apgar1: None, Apgar5: None, Living: Living, Birth Comments: None    #: 6, Date: 02/2018, Sex: None, Weight: None, GA: 6w0d, Delivery: None, Apgar1: None, Apgar5: None, Living: None, Birth Comments: None    #: 7, Date: 06/2018, Sex: None, Weight: None, GA: 6w0d, Delivery: None, Apgar1: None, Apgar5: None, Living: None, Birth Comments: None    #: 8, Date: 2019, Sex: None, Weight: None, GA: 6w0d, Delivery: None, Apgar1: None, Apgar5: None, Living: None, Birth Comments: None    #: 9, Date: 07/07/20, Sex: Female, Weight: 2915 g (6 lb 6 8 oz), GA: 38w4d, Delivery: Vaginal, Spontaneous, Apgar1: 9, Apgar5: 9, Living: Living, Birth Comments: None   Previouse breast reduction surgery?  No    Lactation history:   Has patient previously breast fed: Yes   How long had patient previously breast fed: BF Hx 1st one year, 2nd three years, 3rd 6 months, 4th 3-4 months Previous breast feeding complications:       Past Surgical History:   Procedure Laterality Date    WISDOM TOOTH EXTRACTION         Birth information:  YOB: 2018   Time of birth: 6:48 AM   Sex: female   Delivery type: Vaginal, Spontaneous   Birth Weight: 2915 g (6 lb 6 8 oz)   Percent of Weight Change: 0%     Gestational Age: 38w3d   [unfilled]    Assessment     Breast and nipple assessment: normal assessment     Assessment: normal assessment    Feeding assessment: feeding well    Feeding recommendations:  pump every 2-3 hours feed infant formula for now  +UDS Patient instructed to pump and discard milk until further evaluation done  Information given on Methamphetamines being a drug class L5 which is hazardous  Patient says she took only tylenol, Excedrin for migraines and Zyrtec for allergies  She denies any ADD/ADHD medications or decongestants  Instructions given on pumping  Discussed when to start, frequency, different pumps available versus manual expression  Discussed hygiene of hands and supplies as well as assembly, placement of flanges, size of flanged, preparing the breast and cycles and suction settings on pump  Demonstrated use of hand pump  Discussed labeling of milk, storage, and preparation of stored milk  Encouraged parents to call for assistance, questions, and concerns about breastfeeding  Extension provided      Marce Booth RN 2020 7:58 PM

## 2020-07-07 NOTE — DISCHARGE INSTRUCTIONS
Vaginal Delivery   WHAT YOU NEED TO KNOW:   A vaginal delivery occurs when your baby is born through your vagina (birth canal)  DISCHARGE INSTRUCTIONS:   Seek care immediately if:   · Your leg feels warm, tender, and painful  It may look swollen and red  · You have a fever  · You are urinating very little, or not at all  · You have heavy vaginal bleeding that fills 1 or more sanitary pads in 1 hour  · You feel weak, dizzy, or faint  Contact your healthcare provider if:   · Your abdominal or perineal pain does not go away, or gets worse  · You feel depressed  · You have questions or concerns about your condition or care  Medicines:  · NSAIDs , such as ibuprofen, help decrease swelling, pain, and fever  This medicine is available with or without a doctor's order  NSAIDs can cause stomach bleeding or kidney problems in certain people  If you take blood thinner medicine, always ask your healthcare provider if NSAIDs are safe for you  Always read the medicine label and follow directions  · Stool softeners  make it easier for you to have a bowel movement  You may need this medicine to treat or prevent constipation  · Take your medicine as directed  Contact your healthcare provider if you think your medicine is not helping or if you have side effects  Tell him or her if you are allergic to any medicine  Keep a list of the medicines, vitamins, and herbs you take  Include the amounts, and when and why you take them  Bring the list or the pill bottles to follow-up visits  Carry your medicine list with you in case of an emergency  Follow up with your healthcare provider:  Most women need to return 6 weeks after a vaginal delivery  Ask your healthcare provider how to care for your wounds or stitches, if you have them  Write down your questions so you remember to ask them during your visits  Activity:  Rest as much as possible  Try to keep all activities short   You may be able to do some exercise soon after you have your baby  Talk with your healthcare provider before you start exercising  If you work outside the home, ask when you can return to your job  Kegel exercises:  Kegel exercises may help your vaginal and rectal muscles heal faster  You can do Kegel exercises by tightening and relaxing the muscles around your vagina  Kegel exercises help make the muscles stronger  Breast care:  When your milk comes in, your breasts may feel full and hard  Ask how to care for your breasts, even if you are not breastfeeding  Constipation:  You may have constipation for a period of time after you have your baby  Do not try to push the bowel movement out if it is too hard  High-fiber foods and extra liquids can help you prevent constipation  Examples of high-fiber foods are fruit and bran  Prune juice and water are good liquids to drink  You may also be told to take over-the-counter fiber and stool softener medicines  Take these items as directed  Ask how to prevent or treat hemorrhoids  Perineum care: Your perineum is the area between your vagina and anus  Keep the area clean and dry  This will help it heal and prevent infection  Wash the area gently with soap and water when you bathe or shower  Rinse your perineum with warm water after you urinate or have a bowel movement  Your healthcare provider may suggest you use a warm sitz bath to help decrease pain  To take a sitz bath, fill a bathtub with 4 to 6 inches of warm water  You may also use a sitz bath pan that fits inside the toilet  Sit in the sitz bath for 20 minutes  Do this 2 to 3 times a day, or as directed  The warm water can help decrease pain and swelling  Vaginal discharge: You will have vaginal discharge, called lochia, after your delivery  The lochia is red or dark brown with clots for 1 to 3 days after the birth  The amount will decrease and turn pale pink or brown for 3 to 10 days  It will turn white or yellow on the 10th or 14th day  Lochia is usually gone within 3 weeks  Use a sanitary pad rather than a tampon to prevent a vaginal infection  You will have lochia for up to 3 weeks after your baby is born  Monthly periods: Your period may start again within 7 to 9 weeks after your baby is born  If you are breastfeeding, it may take longer for your period to start again  You can still get pregnant again even though you do not have your monthly period  Talk with your healthcare provider about a birth control method if you do not want to get pregnant  Mood changes: Many new mothers have some kind of mood changes after delivery  Some of these changes occur because of lack of sleep, hormone changes, and caring for a new baby  Some mood changes can be more serious, such as postpartum depression  Talk with your healthcare provider if you feel unable to care for yourself or your baby  Sexual activity:  Do not have sex until your healthcare provider says it is okay  You may notice you have a decreased desire for sex, or sex may be painful  You may need to use a vaginal lubricant (gel) to help make sex more comfortable  © 2017 [facility] Information is for End User's use only and may not be sold, redistributed or otherwise used for commercial purposes  All illustrations and images included in CareNotes® are the copyrighted property of A D A M , Inc  or Mik Hughes  The above information is an  only  It is not intended as medical advice for individual conditions or treatments  Talk to your doctor, nurse or pharmacist before following any medical regimen to see if it is safe and effective for you

## 2020-07-07 NOTE — H&P
History & Physical - OB/GYN   Karen Montmorency 31 y o  female MRN: [id number]  Unit/Bed#: -01 Encounter: [id number]    31 y o  [id number] at 38w4d    Chief complaint:  Labor    HPI:  Labor and rupture membranes  Reports she has to push      Pregnancy Complications:  Patient Active Problem List   Diagnosis    History of prior pregnancy with SGA     Migraines    45 weeks gestation of pregnancy    Obesity affecting pregnancy    Pregnancy with 37 weeks completed gestation       PMH:  Past Medical History:   Diagnosis Date    Anemia     Migraine     Obesity     Urinary tract infection     Varicella     had chicken pox       PSH:  Past Surgical History:   Procedure Laterality Date    WISDOM TOOTH EXTRACTION         Social Hx:  Denies x 3 in pregnancy     OB Hx:  OB History    Para Term  AB Living   9 4 4 0 4 4   SAB TAB Ectopic Multiple Live Births   0 4 0 0 4      # Outcome Date GA Lbr Shay/2nd Weight Sex Delivery Anes PTL Lv   9 Current            8 TAB  6w0d          7 TAB 2018 6w0d          6 TAB 2018 6w0d          5 Term 17 40w0d  3629 g (8 lb) F Vag-Spont None N TAYLOR      Name: Daniela   4 Term 16 40w0d  3175 g (7 lb) M Vag-Spont EPI N TAYLOR      Name: Linda Prow   3 Term 10/27/14 40w0d  2722 g (6 lb) F Vag-Spont EPI N TAYLOR      Birth Comments: induced  due to ovarian cyst      Name: Hubert Cap   2 Term 12 40w0d  2268 g (5 lb) F Vag-Spont EPI Y TAYLOR      Name: Jeff Sera   1 TAB  20w0d             Obstetric Comments   4    4 Terminations       Meds:  No current facility-administered medications on file prior to encounter        Current Outpatient Medications on File Prior to Encounter   Medication Sig Dispense Refill    aspirin 81 mg chewable tablet Chew 2 tablets (162 mg total) daily 60 tablet 3    Prenatal Vit-Fe Fumarate-FA (PRENATAL VITAMINS PO) Take by mouth         Allergies:  No Known Allergies    Labs:  Blood type: O+  Antibody: negative  Group B strep: unknown  HIV: negative  Hepatitis B: negative  RPR: NR  Rubella: Immune    Physical Exam:  /86   Pulse 68   Temp (!) 97 2 °F (36 2 °C) (Temporal)   Resp 18   Ht 5' 4" (1 626 m)   Wt 83 9 kg (185 lb)   LMP 10/05/2019   BMI 31 76 kg/m²     Physical Exam   Constitutional: She is oriented to person, place, and time  She appears well-developed and well-nourished  Cardiovascular: Normal rate, regular rhythm, normal heart sounds and intact distal pulses  Pulmonary/Chest: Effort normal and breath sounds normal  No stridor  No respiratory distress  Abdominal: Soft  Bowel sounds are normal  She exhibits no distension  There is no tenderness  Gravid    Neurological: She is alert and oriented to person, place, and time  Skin: Skin is warm and dry  Psychiatric: She has a normal mood and affect  Her behavior is normal        Estimated Fetal Weight: 7 lbs  Presentation: Vertex    SVE: 10 / 100% / +5  Cordova: Cordova q2 minutes    Membranes: intact    Assessment:   31 y o  [id number] at 38w4d in labor    Plan:   1  Admit to L&D  2  CBC, RPR, type and screen  3  Anticipate   4   FEN: LR IVF, clear liquid diet    Epidural upon request    Discussed with Dr Gayatri Villafuerte DO

## 2020-07-07 NOTE — PLAN OF CARE
Problem: POSTPARTUM  Goal: Experiences normal postpartum course  Description  INTERVENTIONS:  - Monitor maternal vital signs  - Assess uterine involution and lochia  Outcome: Progressing  Goal: Appropriate maternal -  bonding  Description  INTERVENTIONS:  - Identify family support  - Assess for appropriate maternal/infant bonding   -Encourage maternal/infant bonding opportunities  - Referral to  or  as needed  Outcome: Progressing  Goal: Establishment of infant feeding pattern  Description  INTERVENTIONS:  - Assess breast/bottle feeding  - Refer to lactation as needed  Outcome: Progressing  Goal: Incision(s), wounds(s) or drain site(s) healing without S/S of infection  Description  INTERVENTIONS  - Assess and document risk factors for skin impairment   - Assess and document dressing, incision, wound bed, drain sites and surrounding tissue  - Consider nutrition services referral as needed  - Oral mucous membranes remain intact  - Provide patient/ family education  Outcome: Progressing     Problem: BIRTH - VAGINAL/ SECTION  Goal: Fetal and maternal status remain reassuring during the birth process  Description  INTERVENTIONS:  - Monitor vital signs  - Monitor fetal heart rate  - Monitor uterine activity  - Monitor labor progression (vaginal delivery)  - DVT prophylaxis  - Antibiotic prophylaxis  Outcome: Completed  Goal: Emotionally satisfying birthing experience for mother/fetus  Description  Interventions:  - Assess, plan, implement and evaluate the nursing care given to the patient in labor  - Advocate the philosophy that each childbirth experience is a unique experience and support the family's chosen level of involvement and control during the labor process   - Actively participate in both the patient's and family's teaching of the birth process  - Consider cultural, [employer] and age-specific factors and plan care for the patient in labor  Outcome: Completed     Problem: Knowledge Deficit  Goal: Verbalizes understanding of labor plan  Description  Assess patient/family/caregiver's baseline knowledge level and ability to understand information  Provide education via patient/family/caregiver's preferred learning method at appropriate level of understanding  1  Provide teaching at level of understanding  2  Provide teaching via preferred learning method(s)  Outcome: Completed     Problem: Labor & Delivery  Goal: Manages discomfort  Description  Assess and monitor for signs and symptoms of discomfort  Assess patient's pain level regularly and per hospital policy  Administer medications as ordered  Support use of nonpharmacological methods to help control pain such as distraction, imagery, relaxation, and application of heat and cold  Collaborate with interdisciplinary team and patient to determine appropriate pain management plan  1  Include patient in decisions related to comfort  2  Offer non-pharmacological pain management interventions  3  Report ineffective pain management to physician  Outcome: Completed  Goal: Patient vital signs are stable  Description  1  Assess vital signs - vaginal delivery    Outcome: Completed

## 2020-07-07 NOTE — PLAN OF CARE
Problem: POSTPARTUM  Goal: Experiences normal postpartum course  Description  INTERVENTIONS:  - Monitor maternal vital signs  - Assess uterine involution and lochia  Outcome: Progressing  Goal: Appropriate maternal -  bonding  Description  INTERVENTIONS:  - Identify family support  - Assess for appropriate maternal/infant bonding   -Encourage maternal/infant bonding opportunities  - Referral to  or  as needed  Outcome: Progressing  Goal: Establishment of infant feeding pattern  Description  INTERVENTIONS:  - Assess breast/bottle feeding  - Refer to lactation as needed  Outcome: Progressing  Goal: Incision(s), wounds(s) or drain site(s) healing without S/S of infection  Description  INTERVENTIONS  - Assess and document risk factors for skin impairment   - Assess and document dressing, incision, wound bed, drain sites and surrounding tissue  - Consider nutrition services referral as needed  - Oral mucous membranes remain intact  - Provide patient/ family education  Outcome: Progressing     Problem: PAIN - ADULT  Goal: Verbalizes/displays adequate comfort level or baseline comfort level  Description  Interventions:  - Encourage patient to monitor pain and request assistance  - Assess pain using appropriate pain scale   0-10   - Administer analgesics based on type and severity of pain and evaluate response  - Implement non-pharmacological measures as appropriate and evaluate response  - Consider cultural and social influences on pain and pain management  - Notify physician/advanced practitioner if interventions unsuccessful or patient reports new pain   Outcome: Progressing     Problem: INFECTION - ADULT  Goal: Absence or prevention of progression during hospitalization  Description  INTERVENTIONS:  - Assess and monitor for signs and symptoms of infection  - Monitor lab/diagnostic results  - Monitor all insertion sites, i e  indwelling line  - [place] appropriate cooling/warming therapies per order  - Administer medications as ordered  - Instruct and encourage patient and family to use good hand hygiene technique   Outcome: Progressing  Goal: Absence of fever/infection during neutropenic period  Description  INTERVENTIONS:  - Monitor WBC    Outcome: Progressing     Problem: SAFETY ADULT  Goal: Patient will remain free of falls  Description  INTERVENTIONS:  - Assess patient frequently for physical needs  -  Identify cognitive and physical deficits and behaviors that affect risk of falls    -  [place] fall precautions as indicated by assessment   - Educate patient/family on patient safety including physical limitations  - Instruct patient to call for assistance with activity based on assessment  - Modify environment to reduce risk of injury   Outcome: Progressing  Goal: Maintain or return to baseline ADL function  Description  INTERVENTIONS:  -  Assess patient's ability to carry out ADLs; assess patient's baseline for ADL function and identify physical deficits which impact ability to perform ADLs (bathing, care of mouth/teeth, toileting, grooming, dressing, etc )  - Assess/evaluate cause of self-care deficits   - Assess range of motion  - Assess patient's mobility; develop plan if impaired  - Assess patient's need for assistive devices and provide as appropriate  - Encourage maximum independence but intervene and supervise when necessary  - Involve family in performance of ADLs  - Assess for home care needs following discharge   - Provide patient education as appropriate   Outcome: Progressing  Goal: Maintain or return mobility status to optimal level  Description  INTERVENTIONS:  - Assess patient's baseline mobility status (ambulation, transfers, stairs, etc )    - Identify cognitive and physical deficits and behaviors that affect mobility  - Identify mobility aids required to assist with transfers and/or ambulation (gait belt, sit-to-stand, lift, walker, cane, etc )  - [place] fall precautions as indicated by assessment  - Record patient progress and toleration of activity level on Mobility SBAR; progress patient to next Phase/Stage  - Instruct patient to call for assistance with activity based on assessment   Outcome: Progressing     Problem: Knowledge Deficit  Goal: Patient/family/caregiver demonstrates understanding of disease process, treatment plan, medications, and discharge instructions  Description  Complete learning assessment and assess knowledge base    Interventions:  - Provide teaching at level of understanding  - Provide teaching via preferred learning methods  Outcome: Progressing     Problem: DISCHARGE PLANNING  Goal: Discharge to home or other facility with appropriate resources  Description  INTERVENTIONS:  - Identify barriers to discharge w/patient and caregiver  - Arrange for needed discharge resources and transportation as appropriate  - Identify discharge learning needs (meds, wound care, etc )  - Refer to Case Management Department for coordinating discharge planning if the patient needs post-hospital services based on physician/advanced practitioner order or complex needs related to functional status, cognitive ability, or social support system   Outcome: Progressing

## 2020-07-08 LAB — RPR SER QL: NORMAL

## 2020-07-08 PROCEDURE — 99024 POSTOP FOLLOW-UP VISIT: CPT | Performed by: OBSTETRICS & GYNECOLOGY

## 2020-07-08 RX ADMIN — IBUPROFEN 600 MG: 600 TABLET ORAL at 09:47

## 2020-07-08 RX ADMIN — IBUPROFEN 600 MG: 600 TABLET ORAL at 23:25

## 2020-07-08 RX ADMIN — DOCUSATE SODIUM 100 MG: 100 CAPSULE, LIQUID FILLED ORAL at 08:49

## 2020-07-08 RX ADMIN — ACETAMINOPHEN 650 MG: 325 TABLET, FILM COATED ORAL at 00:18

## 2020-07-08 RX ADMIN — BISACODYL 5 MG: 5 TABLET ORAL at 17:27
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
## 2020-07-08 NOTE — PLAN OF CARE
Problem: POSTPARTUM  Goal: Experiences normal postpartum course  Description  INTERVENTIONS:  - Monitor maternal vital signs  - Assess uterine involution and lochia  Outcome: Progressing  Goal: Appropriate maternal -  bonding  Description  INTERVENTIONS:  - Identify family support  - Assess for appropriate maternal/infant bonding   -Encourage maternal/infant bonding opportunities  - Referral to  or  as needed  Outcome: Progressing  Goal: Establishment of infant feeding pattern  Description  INTERVENTIONS:  - Assess breast/bottle feeding  - Refer to lactation as needed  Outcome: Progressing  Goal: Incision(s), wounds(s) or drain site(s) healing without S/S of infection  Description  INTERVENTIONS  - Assess and document risk factors for skin impairment   - Assess and document dressing, incision, wound bed, drain sites and surrounding tissue  - Consider nutrition services referral as needed  - Oral mucous membranes remain intact  - Provide patient/ family education  Outcome: Progressing     Problem: PAIN - ADULT  Goal: Verbalizes/displays adequate comfort level or baseline comfort level  Description  Interventions:  - Encourage patient to monitor pain and request assistance  - Assess pain using appropriate pain scale   0-10   - Administer analgesics based on type and severity of pain and evaluate response  - Implement non-pharmacological measures as appropriate and evaluate response  - Consider cultural and social influences on pain and pain management  - Notify physician/advanced practitioner if interventions unsuccessful or patient reports new pain   Outcome: Progressing     Problem: INFECTION - ADULT  Goal: Absence or prevention of progression during hospitalization  Description  INTERVENTIONS:  - Assess and monitor for signs and symptoms of infection  - Monitor lab/diagnostic results  - Monitor all insertion sites, i e  indwelling line  - [place] appropriate cooling/warming therapies per order  - Administer medications as ordered  - Instruct and encourage patient and family to use good hand hygiene technique   Outcome: Progressing  Goal: Absence of fever/infection during neutropenic period  Description  INTERVENTIONS:  - Monitor WBC    Outcome: Progressing     Problem: SAFETY ADULT  Goal: Patient will remain free of falls  Description  INTERVENTIONS:  - Assess patient frequently for physical needs  -  Identify cognitive and physical deficits and behaviors that affect risk of falls    -  [place] fall precautions as indicated by assessment   - Educate patient/family on patient safety including physical limitations  - Instruct patient to call for assistance with activity based on assessment  - Modify environment to reduce risk of injury   Outcome: Progressing  Goal: Maintain or return to baseline ADL function  Description  INTERVENTIONS:  -  Assess patient's ability to carry out ADLs; assess patient's baseline for ADL function and identify physical deficits which impact ability to perform ADLs (bathing, care of mouth/teeth, toileting, grooming, dressing, etc )  - Assess/evaluate cause of self-care deficits   - Assess range of motion  - Assess patient's mobility; develop plan if impaired  - Assess patient's need for assistive devices and provide as appropriate  - Encourage maximum independence but intervene and supervise when necessary  - Involve family in performance of ADLs  - Assess for home care needs following discharge   - Provide patient education as appropriate   Outcome: Progressing  Goal: Maintain or return mobility status to optimal level  Description  INTERVENTIONS:  - Assess patient's baseline mobility status (ambulation, transfers, stairs, etc )    - Identify cognitive and physical deficits and behaviors that affect mobility  - Identify mobility aids required to assist with transfers and/or ambulation (gait belt, sit-to-stand, lift, walker, cane, etc )  - [place] fall precautions as indicated by assessment  - Record patient progress and toleration of activity level on Mobility SBAR; progress patient to next Phase/Stage  - Instruct patient to call for assistance with activity based on assessment   Outcome: Progressing     Problem: Knowledge Deficit  Goal: Patient/family/caregiver demonstrates understanding of disease process, treatment plan, medications, and discharge instructions  Description  Complete learning assessment and assess knowledge base    Interventions:  - Provide teaching at level of understanding  - Provide teaching via preferred learning methods  Outcome: Progressing     Problem: DISCHARGE PLANNING  Goal: Discharge to home or other facility with appropriate resources  Description  INTERVENTIONS:  - Identify barriers to discharge w/patient and caregiver  - Arrange for needed discharge resources and transportation as appropriate  - Identify discharge learning needs (meds, wound care, etc )  - Refer to Case Management Department for coordinating discharge planning if the patient needs post-hospital services based on physician/advanced practitioner order or complex needs related to functional status, cognitive ability, or social support system   Outcome: Progressing

## 2020-07-08 NOTE — PLAN OF CARE
Problem: POSTPARTUM  Goal: Experiences normal postpartum course  Description  INTERVENTIONS:  - Monitor maternal vital signs  - Assess uterine involution and lochia  Outcome: Progressing  Goal: Appropriate maternal -  bonding  Description  INTERVENTIONS:  - Identify family support  - Assess for appropriate maternal/infant bonding   -Encourage maternal/infant bonding opportunities  - Referral to  or  as needed  Outcome: Progressing  Goal: Establishment of infant feeding pattern  Description  INTERVENTIONS:  - Assess breast/bottle feeding  - Refer to lactation as needed  Outcome: Progressing  Goal: Incision(s), wounds(s) or drain site(s) healing without S/S of infection  Description  INTERVENTIONS  - Assess and document risk factors for skin impairment   - Assess and document dressing, incision, wound bed, drain sites and surrounding tissue  - Consider nutrition services referral as needed  - Oral mucous membranes remain intact  - Provide patient/ family education  Outcome: Progressing     Problem: PAIN - ADULT  Goal: Verbalizes/displays adequate comfort level or baseline comfort level  Description  Interventions:  - Encourage patient to monitor pain and request assistance  - Assess pain using appropriate pain scale   0-10   - Administer analgesics based on type and severity of pain and evaluate response  - Implement non-pharmacological measures as appropriate and evaluate response  - Consider cultural and social influences on pain and pain management  - Notify physician/advanced practitioner if interventions unsuccessful or patient reports new pain   Outcome: Progressing     Problem: INFECTION - ADULT  Goal: Absence or prevention of progression during hospitalization  Description  INTERVENTIONS:  - Assess and monitor for signs and symptoms of infection  - Monitor lab/diagnostic results  - Monitor all insertion sites, i e  indwelling line  - [place] appropriate cooling/warming therapies per order  - Administer medications as ordered  - Instruct and encourage patient and family to use good hand hygiene technique   Outcome: Progressing  Goal: Absence of fever/infection during neutropenic period  Description  INTERVENTIONS:  - Monitor WBC    Outcome: Progressing     Problem: SAFETY ADULT  Goal: Patient will remain free of falls  Description  INTERVENTIONS:  - Assess patient frequently for physical needs  -  Identify cognitive and physical deficits and behaviors that affect risk of falls    -  [place] fall precautions as indicated by assessment   - Educate patient/family on patient safety including physical limitations  - Instruct patient to call for assistance with activity based on assessment  - Modify environment to reduce risk of injury   Outcome: Progressing  Goal: Maintain or return to baseline ADL function  Description  INTERVENTIONS:  -  Assess patient's ability to carry out ADLs; assess patient's baseline for ADL function and identify physical deficits which impact ability to perform ADLs (bathing, care of mouth/teeth, toileting, grooming, dressing, etc )  - Assess/evaluate cause of self-care deficits   - Assess range of motion  - Assess patient's mobility; develop plan if impaired  - Assess patient's need for assistive devices and provide as appropriate  - Encourage maximum independence but intervene and supervise when necessary  - Involve family in performance of ADLs  - Assess for home care needs following discharge   - Provide patient education as appropriate   Outcome: Progressing  Goal: Maintain or return mobility status to optimal level  Description  INTERVENTIONS:  - Assess patient's baseline mobility status (ambulation, transfers, stairs, etc )    - Identify cognitive and physical deficits and behaviors that affect mobility  - Identify mobility aids required to assist with transfers and/or ambulation (gait belt, sit-to-stand, lift, walker, cane, etc )  - [place] fall precautions as indicated by assessment  - Record patient progress and toleration of activity level on Mobility SBAR; progress patient to next Phase/Stage  - Instruct patient to call for assistance with activity based on assessment   Outcome: Progressing     Problem: Knowledge Deficit  Goal: Patient/family/caregiver demonstrates understanding of disease process, treatment plan, medications, and discharge instructions  Description  Complete learning assessment and assess knowledge base    Interventions:  - Provide teaching at level of understanding  - Provide teaching via preferred learning methods  Outcome: Progressing     Problem: DISCHARGE PLANNING  Goal: Discharge to home or other facility with appropriate resources  Description  INTERVENTIONS:  - Identify barriers to discharge w/patient and caregiver  - Arrange for needed discharge resources and transportation as appropriate  - Identify discharge learning needs (meds, wound care, etc )  - Refer to Case Management Department for coordinating discharge planning if the patient needs post-hospital services based on physician/advanced practitioner order or complex needs related to functional status, cognitive ability, or social support system   Outcome: Progressing

## 2020-07-08 NOTE — PROGRESS NOTES
POSTPARTUM NOTE  Adele Bush 31 y o  female MRN: [id number]  Unit/Bed#: -01 Encounter: [id number]    Date: 07/08/20  Procedure: Spontaneous Vaginal Delivery  Postpartum/Postop Day #: 1     SUBJECTIVE: Seen and examined at bed  Has no new concerns  Does not report dizziness, lightheadedness  Pain: yes  Resolution with oral analgesics  Tolerating Oral Intake: yes   Voiding: yes  Flatus: no  Bowel Movement: no  Ambulating: yes  Chest Pain: no  Shortness of Breath: no  Leg Pain/Discomfort: no  Lochia: minimal  Contraception: Pt does not wish to be pregnant again  She would prefer long term contraception like Nexplanon, but is hesitant about having an IUD  She is open to OCPs  Declines depo since she gained a lot of weight after first pregnancy       OBJECTIVE:   Vitals: Temp:  [97 2 °F (36 2 °C)-98 3 °F (36 8 °C)] 98 3 °F (36 8 °C)  HR:  [] 77  Resp:  [18-20] 18  BP: (111-149)/(57-86) 111/73  General: No Acute Distress   Cardiovascular: Regular, Rate and Rhythm, no murmur, normal S1/S2   Lungs: Clear to Auscultation Bilaterally, no wheezing, non-labored breathing   Abdomen: Soft, non-distended, non-tender, no rebound, guarding or CVA tenderness   Fundus: Firm & Non-Tender, Fundal Location: +1 cm above the umbilicus  Lower Extremities: Non-tender, Edema Minimal    LABS / TESTS / MEDICATION:    Recent Results (from the past 72 hour(s))   Strep B DNA probe, amplification    Collection Time: 07/07/20 12:00 AM   Result Value Ref Range    Strep Group B Ag Unknown Negative, Unknown, None, Pending   Blood gas, arterial, cord    Collection Time: 07/07/20  7:00 AM   Result Value Ref Range    pH, Cord Art 7 320 7 230 - 7 430    pCO2, Cord Art 43 4 30 0 - 60 0    pO2, Cord Art 11 7 5 0 - 25 0 mm HG    HCO3, Cord Art 21 9 17 3 - 27 3 mmol/L    Base Exc, Cord Art -4 1 (L) 3 0 - 11 0 mmol/L    O2 Content, Cord Art 2 5 ml/dl    O2 Hgb, Arterial Cord 15 7 %   Blood gas, venous, cord    Collection Time: 07/07/20  7:00 AM   Result Value Ref Range    pH, Cord [place] 7 346 7 190 - 7 490    pCO2, Cord [place] 40 4 27 0 - 43 0 mm HG    pO2, Cord [place] 12 3 (L) 15 0 - 45 0 mm HG    HCO3, Cord [place] 21 6 12 2 - 28 6 mmol/L    Base Exc, Cord [place] -3 8 (L) 1 0 - 9 0 mmol/L    O2 Cont, Cord [place] 3 7 mL/dL    O2 HGB,VENOUS CORD 22 5 %   Type and screen and continue to monitor patient    Collection Time: 07/07/20  9:23 AM   Result Value Ref Range    ABO Grouping O     Rh Factor Positive     Antibody Screen Negative     Specimen Expiration Date [id number]    CBC    Collection Time: 07/07/20  9:23 AM   Result Value Ref Range    WBC 5 61 4 31 - 10 16 Thousand/uL    RBC 3 60 (L) 3 81 - 5 12 Million/uL    Hemoglobin 9 7 (L) 11 5 - 15 4 g/dL    Hematocrit 29 9 (L) 34 8 - 46 1 %    MCV 83 82 - 98 fL    MCH 26 9 26 8 - 34 3 pg    MCHC 32 4 31 4 - 37 4 g/dL    RDW 16 2 (H) 11 6 - 15 1 %    Platelets 210 317 - 084 Thousands/uL    MPV 10 7 8 9 - 12 7 fL   ABORh Recheck - Contact Blood Bank Prior to Collection    Collection Time: 07/07/20  9:43 AM   Result Value Ref Range    ABO Grouping O     Rh Factor Positive    Rapid drug screen, urine    Collection Time: 07/07/20  1:58 PM   Result Value Ref Range    Amph/Meth UR Positive (A) Negative    Barbiturate Ur Negative Negative    Benzodiazepine Urine Negative Negative    Cocaine Urine Negative Negative    Methadone Urine Negative Negative    Opiate Urine Negative Negative    PCP Ur Negative Negative    THC Urine Negative Negative    Oxycodone Urine Negative Negative         docusate sodium 100 mg Oral BID       acetaminophen 650 mg Q4H PRN   benzocaine-menthol-lanolin-aloe  4x Daily PRN   calcium carbonate 1,000 mg Daily PRN   diphenhydrAMINE 25 mg Q6H PRN   hydrocortisone 1 application 4x Daily PRN   ibuprofen 600 mg Q6H PRN   ondansetron 4 mg Q8H PRN   [place]-glycerin 1 pad Q2H PRN       ASSESSMENT:   31 y o  [id number] s/p Spontaneous Vaginal Delivery Postpartum day  1  PLAN:  1) Delivery: Continue routine postpartum care, encourage ambulation, advance diet as tolerated  2) Anticipate discharge 7/9   3) FU with OB/GYN in 3 weeks       Signature / Title: Noemi Tony DO, Family Medicine  Date: 7/8/2020  Time: 6:00 AM

## 2020-07-08 NOTE — PLAN OF CARE
Problem: POSTPARTUM  Goal: Experiences normal postpartum course  Description  INTERVENTIONS:  - Monitor maternal vital signs  - Assess uterine involution and lochia  Outcome: Progressing  Goal: Appropriate maternal -  bonding  Description  INTERVENTIONS:  - Identify family support  - Assess for appropriate maternal/infant bonding   -Encourage maternal/infant bonding opportunities  - Referral to  or  as needed  Outcome: Progressing  Goal: Establishment of infant feeding pattern  Description  INTERVENTIONS:  - Assess breast/bottle feeding  - Refer to lactation as needed  Outcome: Progressing  Goal: Incision(s), wounds(s) or drain site(s) healing without S/S of infection  Description  INTERVENTIONS  - Assess and document risk factors for skin impairment   - Assess and document dressing, incision, wound bed, drain sites and surrounding tissue  - Consider nutrition services referral as needed  - Oral mucous membranes remain intact  - Provide patient/ family education  Outcome: Progressing     Problem: PAIN - ADULT  Goal: Verbalizes/displays adequate comfort level or baseline comfort level  Description  Interventions:  - Encourage patient to monitor pain and request assistance  - Assess pain using appropriate pain scale   0-10   - Administer analgesics based on type and severity of pain and evaluate response  - Implement non-pharmacological measures as appropriate and evaluate response  - Consider cultural and social influences on pain and pain management  - Notify physician/advanced practitioner if interventions unsuccessful or patient reports new pain   Outcome: Progressing     Problem: INFECTION - ADULT  Goal: Absence or prevention of progression during hospitalization  Description  INTERVENTIONS:  - Assess and monitor for signs and symptoms of infection  - Monitor lab/diagnostic results  - Monitor all insertion sites, i e  indwelling line  - [place] appropriate cooling/warming therapies per order  - Administer medications as ordered  - Instruct and encourage patient and family to use good hand hygiene technique   Outcome: Progressing  Goal: Absence of fever/infection during neutropenic period  Description  INTERVENTIONS:  - Monitor WBC    Outcome: Progressing     Problem: SAFETY ADULT  Goal: Patient will remain free of falls  Description  INTERVENTIONS:  - Assess patient frequently for physical needs  -  Identify cognitive and physical deficits and behaviors that affect risk of falls    -  [place] fall precautions as indicated by assessment   - Educate patient/family on patient safety including physical limitations  - Instruct patient to call for assistance with activity based on assessment  - Modify environment to reduce risk of injury   Outcome: Progressing  Goal: Maintain or return to baseline ADL function  Description  INTERVENTIONS:  -  Assess patient's ability to carry out ADLs; assess patient's baseline for ADL function and identify physical deficits which impact ability to perform ADLs (bathing, care of mouth/teeth, toileting, grooming, dressing, etc )  - Assess/evaluate cause of self-care deficits   - Assess range of motion  - Assess patient's mobility; develop plan if impaired  - Assess patient's need for assistive devices and provide as appropriate  - Encourage maximum independence but intervene and supervise when necessary  - Involve family in performance of ADLs  - Assess for home care needs following discharge   - Provide patient education as appropriate   Outcome: Progressing  Goal: Maintain or return mobility status to optimal level  Description  INTERVENTIONS:  - Assess patient's baseline mobility status (ambulation, transfers, stairs, etc )    - Identify cognitive and physical deficits and behaviors that affect mobility  - Identify mobility aids required to assist with transfers and/or ambulation (gait belt, sit-to-stand, lift, walker, cane, etc )  - [place] fall precautions as indicated by assessment  - Record patient progress and toleration of activity level on Mobility SBAR; progress patient to next Phase/Stage  - Instruct patient to call for assistance with activity based on assessment   Outcome: Progressing     Problem: Knowledge Deficit  Goal: Patient/family/caregiver demonstrates understanding of disease process, treatment plan, medications, and discharge instructions  Description  Complete learning assessment and assess knowledge base    Interventions:  - Provide teaching at level of understanding  - Provide teaching via preferred learning methods  Outcome: Progressing     Problem: DISCHARGE PLANNING  Goal: Discharge to home or other facility with appropriate resources  Description  INTERVENTIONS:  - Identify barriers to discharge w/patient and caregiver  - Arrange for needed discharge resources and transportation as appropriate  - Identify discharge learning needs (meds, wound care, etc )  - Refer to Case Management Department for coordinating discharge planning if the patient needs post-hospital services based on physician/advanced practitioner order or complex needs related to functional status, cognitive ability, or social support system   Outcome: Progressing

## 2020-07-08 NOTE — LACTATION NOTE
This note was copied from a baby's chart  Mom pumping and dumping due to + UDS for Meth till clears system  Mom denies taking anything except Tylenol and Allegra  Encouraged to pump after every time she feeds baby for stimulation  [employer] MOB  to call for assistance, questions and concerns  No family at bedside during this time  Extension number for inpatient lactation support provided

## 2020-07-09 VITALS
BODY MASS INDEX: 31.58 KG/M2 | DIASTOLIC BLOOD PRESSURE: 67 MMHG | RESPIRATION RATE: 20 BRPM | SYSTOLIC BLOOD PRESSURE: 128 MMHG | WEIGHT: 185 LBS | OXYGEN SATURATION: 96 % | HEART RATE: 73 BPM | TEMPERATURE: 98.1 F | HEIGHT: 64 IN

## 2020-07-09 PROCEDURE — 99024 POSTOP FOLLOW-UP VISIT: CPT | Performed by: OBSTETRICS & GYNECOLOGY

## 2020-07-09 RX ORDER — ACETAMINOPHEN 325 MG/1
650 TABLET ORAL EVERY 4 HOURS PRN
Qty: 30 TABLET | Refills: 0
Start: 2020-07-09 | End: 2021-10-18 | Stop reason: SDUPTHER

## 2020-07-09 RX ORDER — ACETAMINOPHEN AND CODEINE PHOSPHATE 120; 12 MG/5ML; MG/5ML
1 SOLUTION ORAL DAILY
Qty: 30 TABLET | Refills: 3 | Status: SHIPPED | OUTPATIENT
Start: 2020-07-09 | End: 2022-05-26 | Stop reason: SDUPTHER

## 2020-07-09 RX ORDER — IBUPROFEN 600 MG/1
600 TABLET ORAL EVERY 6 HOURS PRN
Qty: 30 TABLET | Refills: 0
Start: 2020-07-09 | End: 2021-10-18 | Stop reason: SDUPTHER

## 2020-07-09 RX ADMIN — DOCUSATE SODIUM 100 MG: 100 CAPSULE, LIQUID FILLED ORAL at 08:12

## 2020-07-09 NOTE — SOCIAL WORK
Rec'd VM from [facility] C&Y worker Tina Monreal (work cell [phone]) last night advising he is on-call to respond to referral  CM spoke with Juan Luis Murrell this AM who confirmed he met with pt at hospital last night and started assessment and safety planning for infant dc today  As per Juan Luis Murrell, primary assigned worker is [place] (office: [phone]) and her supervisor is Dereje Norton ([phone])  JOEL spoke with Yamel Arce who confirmed receipt of referral for on-going assessment and safety planning  [facility] is aware that mom and baby are medically ready for dc today  Yamel Hernandezbrent reports she will contact pt today to coordinate home visit before infant is clear for dc  As per Livier's request and to substantiate C&Y referral, CM faxed infant UDS results to [facility] (f: [phone])  As per discussion with C&Y worker Yamel Hernandezbrent, pt is OK for dc home  STILL NEED C&Y CLEARANCE FOR INFANT DC  A[facility]  Cm informed nursery RN Taya Kasper and primary RN Jayna Hutson of same  Cm dept will continue to follow through dc

## 2020-07-09 NOTE — DISCHARGE SUMMARY
Discharge Summary - Jesi Ashford 31 y o  female MRN: [id number]    Unit/Bed#: -01 Encounter: [id number]    Admission Date: 2020     Discharge Date: 20      Admitting Diagnosis:   Patient Active Problem List   Diagnosis    History of prior pregnancy with SGA     Migraines    38 weeks gestation of pregnancy    Obesity affecting pregnancy    Pregnancy with 37 weeks completed gestation       Discharge Diagnosis:   Same, delivered  Drug use during pregnancy  Poor prenatal care  Baby was cleared by [facility] C&Y [phone] for dc       Procedures:   spontaneous vaginal delivery    Admitting Attending: Dr Amandeep Domínguez MD  Delivery Attending: Dr Amandeep Domínguez MD  Discharge Attending: Dr Amandeep Domínguez MD    Hospital Course:     Jesi Ashford is a 31 y o  [id number] who was admitted at 38w4d for labor  She came in complete on 20 at 0648 and delivered on the bed  Viable male , weighing 2915g, Apgars of 9 (1 min) and 9  (5 min)  The fetal vertex delivered spontaneously  There was no nuchal cord  The anterior shoulder delivered atraumatically with maternal expulsive forces and the assistance of downward traction  The posterior shoulder delivered with maternal expulsive forces and the assistance of upward traction  The remainder of the fetus delivered spontaneously  1  Viable male  delivered on 20 at 95667 weighing 6lbs 6oz;  Apgar scores of 9 at one minute and 9 at five minutes  2  Spontaneous delivery of placenta with centrally inserted 3-vessel cord  3  Arterial and venous cord gas of 7320 and 7 346, respectively  The patient's post partum course was unremarkable  On day of discharge, she was ambulating and able to reasonably perform all ADLs  She was voiding and had appropriate bowel function  Pain was well controlled  She was discharged home on postpartum day #2 without complications   Patient was instructed to follow up with her OB as an outpatient and was given appropriate warnings to call provider if she develops signs of infection or uncontrolled pain  On day of discharge she was ambulating, voiding spontaneously, tolerating oral intake and hemodynamically stable  She is breast feeding   Mom's blood type is O positive  RhoGAM is not indicated  while baby's is B positive  Rhogam was not given  Condition at discharge:   stable     Disposition:   See After Visit Summary for discharge disposition information  Planned Readmission:   No    Discharge Medications:   Prenatal vitamin daily for 6 months or the duration of nursing whichever is longer  Motrin 600 mg orally every 6 hours as needed for pain  Tylenol (over the counter) per bottle directions as needed for pain  Hydrocortisone cream 1% (over the counter) applied 1-2x daily to hemorrhoids as needed  Witch hazel pads for hemorrhoidal discomfort as needed      Discharge instructions :   -Do not place anything (no partner, tampons or douche) in your vagina for 6 weeks  -You may walk for exercise for the first 6 weeks then gradually return to your usual activities    -Please do not drive for 1 week if you have no stitches and for 2 weeks if you have stitches or underwent a  delivery     -You may take baths or shower per your preference    -Please look at your bust (breasts) in the mirror daily and call provider for redness or tenderness or increased warmth  - If you have had a  section please look at your incision daily as well and call provider for increasing redness or steady drainage from the incision    -Please call your provider if temperature > 100 4*F or 38* C, worsening pain or a foul discharge      Frances Kellogg DO  OB/GYN PGY-1  2020  7:43 AM

## 2020-07-09 NOTE — SOCIAL WORK
As per [facility], she spoke with pt and has home visit scheduled, so mom and baby are cleared for dc home together today and C&Y will f/u with them at home  Kareen Rubina denies any further CM needs at this time  CM informed RN Mayank Hylton and nursery RN Sylvia Cedeño of same  No other CM needs noted for dc home

## 2020-07-09 NOTE — LACTATION NOTE
This note was copied from a baby's chart  CONSULT - LACTATION  Baby Girl Yumiko Noyola 2 days female MRN: [id number]    [address] [facility] Room / Bed: (N)/(N) Encounter: [id number]    Maternal Information     MOTHER:  sAhley Noyola  Maternal Age: 31 y o    OB History: #: 1, Date: 2009, Sex: None, Weight: None, GA: 20w0d, Delivery: None, Apgar1: None, Apgar5: None, Living: None, Birth Comments: None    #: 2, Date: 03/12/12, Sex: Female, Weight: 2268 g (5 lb), GA: 40w0d, Delivery: Vaginal, Spontaneous, Apgar1: None, Apgar5: None, Living: Living, Birth Comments: None    #: 3, Date: 10/27/14, Sex: Female, Weight: 2722 g (6 lb), GA: 40w0d, Delivery: Vaginal, Spontaneous, Apgar1: None, Apgar5: None, Living: Living, Birth Comments: induced  due to ovarian cyst    #: 4, Date: 02/29/16, Sex: Male, Weight: 3175 g (7 lb), GA: 40w0d, Delivery: Vaginal, Spontaneous, Apgar1: None, Apgar5: None, Living: Living, Birth Comments: None    #: 5, Date: 04/25/17, Sex: Female, Weight: 3629 g (8 lb), GA: 40w0d, Delivery: Vaginal, Spontaneous, Apgar1: None, Apgar5: None, Living: Living, Birth Comments: None    #: 6, Date: 02/2018, Sex: None, Weight: None, GA: 6w0d, Delivery: None, Apgar1: None, Apgar5: None, Living: None, Birth Comments: None    #: 7, Date: 06/2018, Sex: None, Weight: None, GA: 6w0d, Delivery: None, Apgar1: None, Apgar5: None, Living: None, Birth Comments: None    #: 8, Date: 2019, Sex: None, Weight: None, GA: 6w0d, Delivery: None, Apgar1: None, Apgar5: None, Living: None, Birth Comments: None    #: 9, Date: 07/07/20, Sex: Female, Weight: 2915 g (6 lb 6 8 oz), GA: 38w4d, Delivery: Vaginal, Spontaneous, Apgar1: 9, Apgar5: 9, Living: Living, Birth Comments: None   Previouse breast reduction surgery?  No    Lactation history:   Has patient previously breast fed: Yes   How long had patient previously breast fed: BF Hx 1st one year, 2nd three years, 3rd 6 months, 4th 3-4 months Previous breast feeding complications:       Past Surgical History:   Procedure Laterality Date    WISDOM TOOTH EXTRACTION         Birth information:  YOB: 2018   Time of birth: 6:48 AM   Sex: female   Delivery type: Vaginal, Spontaneous   Birth Weight: 2915 g (6 lb 6 8 oz)   Percent of Weight Change: -5%     Gestational Age: 38w3d   [unfilled]    Assessment     Breast and nipple assessment: normal assessment      Feeding recommendations:  pump every 2-3 hours to protect supply until cleared by [facility] to feed infant at the breast with breast milk related to + UDS for methamphetamines  Clarified with Kristine Mcgee that she needs to continue to pump and discard her milk until cleared by [employer] Youth, not just until 13 6 hours for the Tmax 1/2 life for methamphetamines is reached and passed  Kristine Mcgee continues to deny illicit drug usage  Encouraged follow up once clearance is given to feed Journee at the breast with [address] if [place] encounters difficulty latching Journee tot he breast     Met with mother to go over discharge breastfeeding booklet including the feeding log  Emphasized 8 or more (12) feedings in a 24 hour period, what to expect for the number of diapers per day of life and the progression of properties of the  stooling pattern  Reviewed breastfeeding and your lifestyle, storage and preparation of breast milk, how to keep you breast pump clean, the employed breastfeeding mother and paced bottle feeding handouts  Booklet included Breastfeeding Resources for after discharge including access to the number for the [address]  Discussed s/s engorgement, blocked milk ducts, and mastitis  Discussed how to remedy at home and when to contact physician  Encouraged parents to call for assistance, questions, and concerns about breastfeeding  Extension provided      Lucas Cornelius RN 2020 10:05 AM

## 2020-07-09 NOTE — PLAN OF CARE
Problem: POSTPARTUM  Goal: Experiences normal postpartum course  Description  INTERVENTIONS:  - Monitor maternal vital signs  - Assess uterine involution and lochia  Outcome: Completed  Goal: Appropriate maternal -  bonding  Description  INTERVENTIONS:  - Identify family support  - Assess for appropriate maternal/infant bonding   -Encourage maternal/infant bonding opportunities  - Referral to  or  as needed  Outcome: Completed  Goal: Establishment of infant feeding pattern  Description  INTERVENTIONS:  - Assess breast/bottle feeding  - Refer to lactation as needed  Outcome: Completed  Goal: Incision(s), wounds(s) or drain site(s) healing without S/S of infection  Description  INTERVENTIONS  - Assess and document risk factors for skin impairment   - Assess and document dressing, incision, wound bed, drain sites and surrounding tissue  - Consider nutrition services referral as needed  - Oral mucous membranes remain intact  - Provide patient/ family education  Outcome: Completed     Problem: PAIN - ADULT  Goal: Verbalizes/displays adequate comfort level or baseline comfort level  Description  Interventions:  - Encourage patient to monitor pain and request assistance  - Assess pain using appropriate pain scale   0-10   - Administer analgesics based on type and severity of pain and evaluate response  - Implement non-pharmacological measures as appropriate and evaluate response  - Consider cultural and social influences on pain and pain management  - Notify physician/advanced practitioner if interventions unsuccessful or patient reports new pain   Outcome: Completed     Problem: INFECTION - ADULT  Goal: Absence or prevention of progression during hospitalization  Description  INTERVENTIONS:  - Assess and monitor for signs and symptoms of infection  - Monitor lab/diagnostic results  - Monitor all insertion sites, i e  indwelling line  - [place] appropriate cooling/warming therapies per order  - Administer medications as ordered  - Instruct and encourage patient and family to use good hand hygiene technique   Outcome: Completed  Goal: Absence of fever/infection during neutropenic period  Description  INTERVENTIONS:  - Monitor WBC    Outcome: Completed     Problem: SAFETY ADULT  Goal: Patient will remain free of falls  Description  INTERVENTIONS:  - Assess patient frequently for physical needs  -  Identify cognitive and physical deficits and behaviors that affect risk of falls    -  [place] fall precautions as indicated by assessment   - Educate patient/family on patient safety including physical limitations  - Instruct patient to call for assistance with activity based on assessment  - Modify environment to reduce risk of injury   Outcome: Completed  Goal: Maintain or return to baseline ADL function  Description  INTERVENTIONS:  -  Assess patient's ability to carry out ADLs; assess patient's baseline for ADL function and identify physical deficits which impact ability to perform ADLs (bathing, care of mouth/teeth, toileting, grooming, dressing, etc )  - Assess/evaluate cause of self-care deficits   - Assess range of motion  - Assess patient's mobility; develop plan if impaired  - Assess patient's need for assistive devices and provide as appropriate  - Encourage maximum independence but intervene and supervise when necessary  - Involve family in performance of ADLs  - Assess for home care needs following discharge   - Provide patient education as appropriate   Outcome: Completed  Goal: Maintain or return mobility status to optimal level  Description  INTERVENTIONS:  - Assess patient's baseline mobility status (ambulation, transfers, stairs, etc )    - Identify cognitive and physical deficits and behaviors that affect mobility  - Identify mobility aids required to assist with transfers and/or ambulation (gait belt, sit-to-stand, lift, walker, cane, etc )  - [place] fall precautions as indicated by assessment  - Record patient progress and toleration of activity level on Mobility SBAR; progress patient to next Phase/Stage  - Instruct patient to call for assistance with activity based on assessment   Outcome: Completed     Problem: Knowledge Deficit  Goal: Patient/family/caregiver demonstrates understanding of disease process, treatment plan, medications, and discharge instructions  Description  Complete learning assessment and assess knowledge base    Interventions:  - Provide teaching at level of understanding  - Provide teaching via preferred learning methods  Outcome: Completed     Problem: DISCHARGE PLANNING  Goal: Discharge to home or other facility with appropriate resources  Description  INTERVENTIONS:  - Identify barriers to discharge w/patient and caregiver  - Arrange for needed discharge resources and transportation as appropriate  - Identify discharge learning needs (meds, wound care, etc )  - Refer to Case Management Department for coordinating discharge planning if the patient needs post-hospital services based on physician/advanced practitioner order or complex needs related to functional status, cognitive ability, or social support system   Outcome: Completed

## 2020-08-13 ENCOUNTER — DOCTOR'S OFFICE (OUTPATIENT)
Dept: URBAN - METROPOLITAN AREA CLINIC 137 | Facility: CLINIC | Age: 30
Setting detail: OPHTHALMOLOGY
End: 2020-08-13
Payer: COMMERCIAL

## 2020-08-13 ENCOUNTER — OPTICAL OFFICE (OUTPATIENT)
Dept: URBAN - METROPOLITAN AREA CLINIC 146 | Facility: CLINIC | Age: 30
Setting detail: OPHTHALMOLOGY
End: 2020-08-13
Payer: COMMERCIAL

## 2020-08-13 DIAGNOSIS — H52.223: ICD-10-CM

## 2020-08-13 DIAGNOSIS — H52.13: ICD-10-CM

## 2020-08-13 PROCEDURE — V2103 SPHEROCYLINDR 4.00D/12-2.00D: HCPCS | Performed by: OPTOMETRIST

## 2020-08-13 PROCEDURE — 92002 INTRM OPH EXAM NEW PATIENT: CPT | Performed by: OPTOMETRIST

## 2020-08-13 PROCEDURE — V2020 VISION SVCS FRAMES PURCHASES: HCPCS | Performed by: OPTOMETRIST

## 2020-08-13 ASSESSMENT — REFRACTION_CURRENTRX
OS_CYLINDER: -0.75
OD_SPHERE: -2.50
OS_AXIS: 166
OS_SPHERE: -1.75
OD_AXIS: 175
OD_CYLINDER: -0.75
OS_OVR_VA: 20/
OD_OVR_VA: 20/

## 2020-08-13 ASSESSMENT — REFRACTION_MANIFEST
OS_VA1: 20/20
OD_AXIS: 175
OD_SPHERE: -2.00
OD_VA1: 20/20
OS_AXIS: 180
OD_CYLINDER: -0.50
OS_SPHERE: -2.00
OS_CYLINDER: -0.50

## 2020-08-13 ASSESSMENT — SPHEQUIV_DERIVED
OS_SPHEQUIV: -2.5
OD_SPHEQUIV: -2.625
OS_SPHEQUIV: -2.25
OD_SPHEQUIV: -2.25

## 2020-08-13 ASSESSMENT — REFRACTION_AUTOREFRACTION
OS_AXIS: 178
OS_SPHERE: -2.25
OD_CYLINDER: -0.75
OS_CYLINDER: -0.50
OD_AXIS: 162
OD_SPHERE: -2.25

## 2020-08-13 ASSESSMENT — VISUAL ACUITY
OS_BCVA: 20/20 -2
OD_BCVA: 20/20-1

## 2020-08-13 ASSESSMENT — CONFRONTATIONAL VISUAL FIELD TEST (CVF)
OD_FINDINGS: FULL
OS_FINDINGS: FULL

## 2021-10-13 ENCOUNTER — HOSPITAL ENCOUNTER (EMERGENCY)
Facility: HOSPITAL | Age: 31
End: 2021-10-14
Attending: EMERGENCY MEDICINE
Payer: COMMERCIAL

## 2021-10-13 VITALS
OXYGEN SATURATION: 100 % | HEART RATE: 88 BPM | SYSTOLIC BLOOD PRESSURE: 163 MMHG | TEMPERATURE: 97.5 F | DIASTOLIC BLOOD PRESSURE: 145 MMHG | RESPIRATION RATE: 20 BRPM

## 2021-10-13 DIAGNOSIS — N93.9 VAGINAL BLEEDING: Primary | ICD-10-CM

## 2021-10-13 PROBLEM — R58 HEMORRHAGE: Status: ACTIVE | Noted: 2021-10-13

## 2021-10-13 LAB
ALBUMIN SERPL BCP-MCNC: 3.6 G/DL (ref 3.4–4.8)
ALP SERPL-CCNC: 57.6 U/L (ref 35–140)
ALT SERPL W P-5'-P-CCNC: 6 U/L (ref 5–54)
ANION GAP SERPL CALCULATED.3IONS-SCNC: 10 MMOL/L (ref 4–13)
APTT PPP: 24 SECONDS (ref 23–37)
AST SERPL W P-5'-P-CCNC: 18 U/L (ref 15–41)
BASOPHILS # BLD AUTO: 0.02 THOUSANDS/ΜL (ref 0–0.1)
BASOPHILS NFR BLD AUTO: 0 % (ref 0–1)
BILIRUB SERPL-MCNC: 0.4 MG/DL (ref 0.3–1.2)
BUN SERPL-MCNC: 6 MG/DL (ref 6–20)
CALCIUM SERPL-MCNC: 8.8 MG/DL (ref 8.4–10.2)
CHLORIDE SERPL-SCNC: 103 MMOL/L (ref 96–108)
CO2 SERPL-SCNC: 22 MMOL/L (ref 22–33)
CREAT SERPL-MCNC: 0.87 MG/DL (ref 0.4–1.1)
EOSINOPHIL # BLD AUTO: 0.02 THOUSAND/ΜL (ref 0–0.61)
EOSINOPHIL NFR BLD AUTO: 0 % (ref 0–6)
ERYTHROCYTE [DISTWIDTH] IN BLOOD BY AUTOMATED COUNT: 12.8 % (ref 11.6–15.1)
GFR SERPL CREATININE-BSD FRML MDRD: 90 ML/MIN/1.73SQ M
GLUCOSE SERPL-MCNC: 161 MG/DL (ref 65–140)
HCG SERPL QL: POSITIVE
HCT VFR BLD AUTO: 31.7 % (ref 34.8–46.1)
HGB BLD-MCNC: 10.7 G/DL (ref 11.5–15.4)
IMM GRANULOCYTES # BLD AUTO: 0 THOUSAND/UL (ref 0–0.2)
IMM GRANULOCYTES NFR BLD AUTO: 0 % (ref 0–2)
INR PPP: 1.12 (ref 0.84–1.19)
LYMPHOCYTES # BLD AUTO: 2.98 THOUSANDS/ΜL (ref 0.6–4.47)
LYMPHOCYTES NFR BLD AUTO: 54 % (ref 14–44)
MCH RBC QN AUTO: 29.6 PG (ref 26.8–34.3)
MCHC RBC AUTO-ENTMCNC: 33.8 G/DL (ref 31.4–37.4)
MCV RBC AUTO: 88 FL (ref 82–98)
MONOCYTES # BLD AUTO: 0.34 THOUSAND/ΜL (ref 0.17–1.22)
MONOCYTES NFR BLD AUTO: 6 % (ref 4–12)
NEUTROPHILS # BLD AUTO: 2.27 THOUSANDS/ΜL (ref 1.85–7.62)
NEUTS SEG NFR BLD AUTO: 40 % (ref 43–75)
PLATELET # BLD AUTO: 250 THOUSANDS/UL (ref 149–390)
PMV BLD AUTO: 10.1 FL (ref 8.9–12.7)
POTASSIUM SERPL-SCNC: 3.5 MMOL/L (ref 3.5–5)
PROT SERPL-MCNC: 6.8 G/DL (ref 6.4–8.3)
PROTHROMBIN TIME: 14.3 SECONDS (ref 11.6–14.5)
RBC # BLD AUTO: 3.62 MILLION/UL (ref 3.81–5.12)
SODIUM SERPL-SCNC: 135 MMOL/L (ref 133–145)
WBC # BLD AUTO: 5.63 THOUSAND/UL (ref 4.31–10.16)

## 2021-10-13 PROCEDURE — 86900 BLOOD TYPING SEROLOGIC ABO: CPT | Performed by: EMERGENCY MEDICINE

## 2021-10-13 PROCEDURE — P9017 PLASMA 1 DONOR FRZ W/IN 8 HR: HCPCS

## 2021-10-13 PROCEDURE — 36430 TRANSFUSION BLD/BLD COMPNT: CPT

## 2021-10-13 PROCEDURE — 36415 COLL VENOUS BLD VENIPUNCTURE: CPT | Performed by: EMERGENCY MEDICINE

## 2021-10-13 PROCEDURE — 84703 CHORIONIC GONADOTROPIN ASSAY: CPT | Performed by: EMERGENCY MEDICINE

## 2021-10-13 PROCEDURE — 85610 PROTHROMBIN TIME: CPT | Performed by: EMERGENCY MEDICINE

## 2021-10-13 PROCEDURE — 80053 COMPREHEN METABOLIC PANEL: CPT | Performed by: EMERGENCY MEDICINE

## 2021-10-13 PROCEDURE — 86901 BLOOD TYPING SEROLOGIC RH(D): CPT | Performed by: EMERGENCY MEDICINE

## 2021-10-13 PROCEDURE — 86850 RBC ANTIBODY SCREEN: CPT | Performed by: EMERGENCY MEDICINE

## 2021-10-13 PROCEDURE — 93005 ELECTROCARDIOGRAM TRACING: CPT

## 2021-10-13 PROCEDURE — P9016 RBC LEUKOCYTES REDUCED: HCPCS

## 2021-10-13 PROCEDURE — 96365 THER/PROPH/DIAG IV INF INIT: CPT

## 2021-10-13 PROCEDURE — 99291 CRITICAL CARE FIRST HOUR: CPT | Performed by: EMERGENCY MEDICINE

## 2021-10-13 PROCEDURE — 85025 COMPLETE CBC W/AUTO DIFF WBC: CPT | Performed by: EMERGENCY MEDICINE

## 2021-10-13 PROCEDURE — 99291 CRITICAL CARE FIRST HOUR: CPT

## 2021-10-13 PROCEDURE — 85730 THROMBOPLASTIN TIME PARTIAL: CPT | Performed by: EMERGENCY MEDICINE

## 2021-10-13 PROCEDURE — 84702 CHORIONIC GONADOTROPIN TEST: CPT | Performed by: EMERGENCY MEDICINE

## 2021-10-13 PROCEDURE — 86920 COMPATIBILITY TEST SPIN: CPT

## 2021-10-13 PROCEDURE — 96361 HYDRATE IV INFUSION ADD-ON: CPT

## 2021-10-13 RX ORDER — MEGESTROL ACETATE 40 MG/1
40 TABLET ORAL ONCE
Status: DISCONTINUED | OUTPATIENT
Start: 2021-10-13 | End: 2021-10-14 | Stop reason: HOSPADM

## 2021-10-13 RX ADMIN — SODIUM CHLORIDE 1000 MG: 9 INJECTION, SOLUTION INTRAVENOUS at 23:23

## 2021-10-13 RX ADMIN — SODIUM CHLORIDE 2000 ML: 0.9 INJECTION, SOLUTION INTRAVENOUS at 23:02

## 2021-10-14 ENCOUNTER — HOSPITAL ENCOUNTER (OUTPATIENT)
Facility: HOSPITAL | Age: 31
Setting detail: OUTPATIENT SURGERY
Discharge: HOME | End: 2021-10-14
Attending: STUDENT IN AN ORGANIZED HEALTH CARE EDUCATION/TRAINING PROGRAM | Admitting: STUDENT IN AN ORGANIZED HEALTH CARE EDUCATION/TRAINING PROGRAM
Payer: COMMERCIAL

## 2021-10-14 ENCOUNTER — APPOINTMENT (OUTPATIENT)
Dept: RADIOLOGY | Facility: HOSPITAL | Age: 31
End: 2021-10-14
Payer: COMMERCIAL

## 2021-10-14 ENCOUNTER — ANESTHESIA EVENT (EMERGENCY)
Dept: PERIOP | Facility: HOSPITAL | Age: 31
End: 2021-10-14
Payer: COMMERCIAL

## 2021-10-14 ENCOUNTER — APPOINTMENT (EMERGENCY)
Dept: RADIOLOGY | Facility: HOSPITAL | Age: 31
End: 2021-10-14
Payer: COMMERCIAL

## 2021-10-14 ENCOUNTER — ANESTHESIA (EMERGENCY)
Dept: PERIOP | Facility: HOSPITAL | Age: 31
End: 2021-10-14
Payer: COMMERCIAL

## 2021-10-14 VITALS
OXYGEN SATURATION: 99 % | SYSTOLIC BLOOD PRESSURE: 97 MMHG | RESPIRATION RATE: 18 BRPM | HEART RATE: 104 BPM | TEMPERATURE: 98.2 F | DIASTOLIC BLOOD PRESSURE: 58 MMHG

## 2021-10-14 DIAGNOSIS — O03.1 INCOMPLETE ABORTION WITH DELAYED OR EXCESSIVE HEMORRHAGE: ICD-10-CM

## 2021-10-14 DIAGNOSIS — S99.912A LEFT ANKLE INJURY, INITIAL ENCOUNTER: ICD-10-CM

## 2021-10-14 DIAGNOSIS — R58 HEMORRHAGE: Primary | ICD-10-CM

## 2021-10-14 DIAGNOSIS — O03.9 MISCARRIAGE: ICD-10-CM

## 2021-10-14 LAB
ABO GROUP BLD BPU: NORMAL
ABO GROUP BLD BPU: NORMAL
ABO GROUP BLD: NORMAL
ABO GROUP BLD: NORMAL
ATRIAL RATE: 85 BPM
B-HCG SERPL-ACNC: 1183.53 MIU/ML
BASOPHILS # BLD AUTO: 0.02 THOUSANDS/ΜL (ref 0–0.1)
BASOPHILS NFR BLD AUTO: 0 % (ref 0–1)
BLD GP AB SCN SERPL QL: NEGATIVE
BLD GP AB SCN SERPL QL: NEGATIVE
BPU ID: NORMAL
BPU ID: NORMAL
CROSSMATCH: NORMAL
CROSSMATCH: NORMAL
EOSINOPHIL # BLD AUTO: 0.02 THOUSAND/ΜL (ref 0–0.61)
EOSINOPHIL NFR BLD AUTO: 0 % (ref 0–6)
ERYTHROCYTE [DISTWIDTH] IN BLOOD BY AUTOMATED COUNT: 14.3 % (ref 11.6–15.1)
ERYTHROCYTE [DISTWIDTH] IN BLOOD BY AUTOMATED COUNT: 14.3 % (ref 11.6–15.1)
ERYTHROCYTE [DISTWIDTH] IN BLOOD BY AUTOMATED COUNT: 14.7 % (ref 11.6–15.1)
HCT VFR BLD AUTO: 27.1 % (ref 34.8–46.1)
HCT VFR BLD AUTO: 28.1 % (ref 34.8–46.1)
HCT VFR BLD AUTO: 29.2 % (ref 34.8–46.1)
HGB BLD-MCNC: 9.1 G/DL (ref 11.5–15.4)
HGB BLD-MCNC: 9.2 G/DL (ref 11.5–15.4)
HGB BLD-MCNC: 9.5 G/DL (ref 11.5–15.4)
IMM GRANULOCYTES # BLD AUTO: 0.03 THOUSAND/UL (ref 0–0.2)
IMM GRANULOCYTES NFR BLD AUTO: 0 % (ref 0–2)
LYMPHOCYTES # BLD AUTO: 1.51 THOUSANDS/ΜL (ref 0.6–4.47)
LYMPHOCYTES NFR BLD AUTO: 15 % (ref 14–44)
MCH RBC QN AUTO: 28.7 PG (ref 26.8–34.3)
MCH RBC QN AUTO: 29.1 PG (ref 26.8–34.3)
MCH RBC QN AUTO: 29.4 PG (ref 26.8–34.3)
MCHC RBC AUTO-ENTMCNC: 32.4 G/DL (ref 31.4–37.4)
MCHC RBC AUTO-ENTMCNC: 32.5 G/DL (ref 31.4–37.4)
MCHC RBC AUTO-ENTMCNC: 33.9 G/DL (ref 31.4–37.4)
MCV RBC AUTO: 87 FL (ref 82–98)
MCV RBC AUTO: 89 FL (ref 82–98)
MCV RBC AUTO: 90 FL (ref 82–98)
MONOCYTES # BLD AUTO: 0.56 THOUSAND/ΜL (ref 0.17–1.22)
MONOCYTES NFR BLD AUTO: 6 % (ref 4–12)
NEUTROPHILS # BLD AUTO: 8.01 THOUSANDS/ΜL (ref 1.85–7.62)
NEUTS SEG NFR BLD AUTO: 79 % (ref 43–75)
NRBC BLD AUTO-RTO: 0 /100 WBCS
P AXIS: 59 DEGREES
PLATELET # BLD AUTO: 121 THOUSANDS/UL (ref 149–390)
PLATELET # BLD AUTO: 127 THOUSANDS/UL (ref 149–390)
PLATELET # BLD AUTO: 172 THOUSANDS/UL (ref 149–390)
PMV BLD AUTO: 10.1 FL (ref 8.9–12.7)
PMV BLD AUTO: 9.8 FL (ref 8.9–12.7)
PMV BLD AUTO: 9.9 FL (ref 8.9–12.7)
PR INTERVAL: 140 MS
QRS AXIS: 67 DEGREES
QRSD INTERVAL: 89 MS
QT INTERVAL: 393 MS
QTC INTERVAL: 454 MS
RBC # BLD AUTO: 3.13 MILLION/UL (ref 3.81–5.12)
RBC # BLD AUTO: 3.17 MILLION/UL (ref 3.81–5.12)
RBC # BLD AUTO: 3.26 MILLION/UL (ref 3.81–5.12)
RH BLD: POSITIVE
RH BLD: POSITIVE
SPECIMEN EXPIRATION DATE: NORMAL
SPECIMEN EXPIRATION DATE: NORMAL
T WAVE AXIS: 66 DEGREES
UNIT DISPENSE STATUS: NORMAL
UNIT DISPENSE STATUS: NORMAL
UNIT PRODUCT CODE: NORMAL
UNIT PRODUCT CODE: NORMAL
UNIT PRODUCT VOLUME: 350 ML
UNIT PRODUCT VOLUME: 350 ML
UNIT RH: NORMAL
UNIT RH: NORMAL
VENTRICULAR RATE: 80 BPM
WBC # BLD AUTO: 10.15 THOUSAND/UL (ref 4.31–10.16)
WBC # BLD AUTO: 8.35 THOUSAND/UL (ref 4.31–10.16)
WBC # BLD AUTO: 8.5 THOUSAND/UL (ref 4.31–10.16)

## 2021-10-14 PROCEDURE — 97530 THERAPEUTIC ACTIVITIES: CPT

## 2021-10-14 PROCEDURE — 99284 EMERGENCY DEPT VISIT MOD MDM: CPT | Performed by: EMERGENCY MEDICINE

## 2021-10-14 PROCEDURE — 85014 HEMATOCRIT: CPT

## 2021-10-14 PROCEDURE — 84132 ASSAY OF SERUM POTASSIUM: CPT

## 2021-10-14 PROCEDURE — 86923 COMPATIBILITY TEST ELECTRIC: CPT

## 2021-10-14 PROCEDURE — 86901 BLOOD TYPING SEROLOGIC RH(D): CPT | Performed by: STUDENT IN AN ORGANIZED HEALTH CARE EDUCATION/TRAINING PROGRAM

## 2021-10-14 PROCEDURE — 85025 COMPLETE CBC W/AUTO DIFF WBC: CPT | Performed by: STUDENT IN AN ORGANIZED HEALTH CARE EDUCATION/TRAINING PROGRAM

## 2021-10-14 PROCEDURE — 88341 IMHCHEM/IMCYTCHM EA ADD ANTB: CPT | Performed by: PATHOLOGY

## 2021-10-14 PROCEDURE — 29515 APPLICATION SHORT LEG SPLINT: CPT | Performed by: EMERGENCY MEDICINE

## 2021-10-14 PROCEDURE — 99215 OFFICE O/P EST HI 40 MIN: CPT | Performed by: STUDENT IN AN ORGANIZED HEALTH CARE EDUCATION/TRAINING PROGRAM

## 2021-10-14 PROCEDURE — P9017 PLASMA 1 DONOR FRZ W/IN 8 HR: HCPCS

## 2021-10-14 PROCEDURE — 97163 PT EVAL HIGH COMPLEX 45 MIN: CPT

## 2021-10-14 PROCEDURE — 59812 TREATMENT OF MISCARRIAGE: CPT | Performed by: STUDENT IN AN ORGANIZED HEALTH CARE EDUCATION/TRAINING PROGRAM

## 2021-10-14 PROCEDURE — 82803 BLOOD GASES ANY COMBINATION: CPT

## 2021-10-14 PROCEDURE — 93010 ELECTROCARDIOGRAM REPORT: CPT | Performed by: INTERNAL MEDICINE

## 2021-10-14 PROCEDURE — 85027 COMPLETE CBC AUTOMATED: CPT | Performed by: STUDENT IN AN ORGANIZED HEALTH CARE EDUCATION/TRAINING PROGRAM

## 2021-10-14 PROCEDURE — 99244 OFF/OP CNSLTJ NEW/EST MOD 40: CPT | Performed by: PHYSICIAN ASSISTANT

## 2021-10-14 PROCEDURE — 85027 COMPLETE CBC AUTOMATED: CPT | Performed by: NURSE ANESTHETIST, CERTIFIED REGISTERED

## 2021-10-14 PROCEDURE — 73610 X-RAY EXAM OF ANKLE: CPT

## 2021-10-14 PROCEDURE — 99285 EMERGENCY DEPT VISIT HI MDM: CPT

## 2021-10-14 PROCEDURE — 36415 COLL VENOUS BLD VENIPUNCTURE: CPT | Performed by: STUDENT IN AN ORGANIZED HEALTH CARE EDUCATION/TRAINING PROGRAM

## 2021-10-14 PROCEDURE — 86900 BLOOD TYPING SEROLOGIC ABO: CPT | Performed by: STUDENT IN AN ORGANIZED HEALTH CARE EDUCATION/TRAINING PROGRAM

## 2021-10-14 PROCEDURE — 88182 CELL MARKER STUDY: CPT | Performed by: PATHOLOGY

## 2021-10-14 PROCEDURE — 88342 IMHCHEM/IMCYTCHM 1ST ANTB: CPT | Performed by: PATHOLOGY

## 2021-10-14 PROCEDURE — P9016 RBC LEUKOCYTES REDUCED: HCPCS

## 2021-10-14 PROCEDURE — 97116 GAIT TRAINING THERAPY: CPT

## 2021-10-14 PROCEDURE — 73600 X-RAY EXAM OF ANKLE: CPT

## 2021-10-14 PROCEDURE — 99024 POSTOP FOLLOW-UP VISIT: CPT | Performed by: OBSTETRICS & GYNECOLOGY

## 2021-10-14 PROCEDURE — 84295 ASSAY OF SERUM SODIUM: CPT

## 2021-10-14 PROCEDURE — 88305 TISSUE EXAM BY PATHOLOGIST: CPT | Performed by: PATHOLOGY

## 2021-10-14 PROCEDURE — 86850 RBC ANTIBODY SCREEN: CPT | Performed by: STUDENT IN AN ORGANIZED HEALTH CARE EDUCATION/TRAINING PROGRAM

## 2021-10-14 PROCEDURE — 82947 ASSAY GLUCOSE BLOOD QUANT: CPT

## 2021-10-14 RX ORDER — DEXAMETHASONE SODIUM PHOSPHATE 10 MG/ML
INJECTION, SOLUTION INTRAMUSCULAR; INTRAVENOUS AS NEEDED
Status: DISCONTINUED | OUTPATIENT
Start: 2021-10-14 | End: 2021-10-14

## 2021-10-14 RX ORDER — ONDANSETRON 2 MG/ML
4 INJECTION INTRAMUSCULAR; INTRAVENOUS ONCE AS NEEDED
Status: DISCONTINUED | OUTPATIENT
Start: 2021-10-14 | End: 2021-10-14 | Stop reason: HOSPADM

## 2021-10-14 RX ORDER — OXYTOCIN 10 [USP'U]/ML
INJECTION, SOLUTION INTRAMUSCULAR; INTRAVENOUS
Status: DISCONTINUED
Start: 2021-10-14 | End: 2021-10-14 | Stop reason: WASHOUT

## 2021-10-14 RX ORDER — SODIUM CHLORIDE, SODIUM LACTATE, POTASSIUM CHLORIDE, CALCIUM CHLORIDE 600; 310; 30; 20 MG/100ML; MG/100ML; MG/100ML; MG/100ML
150 INJECTION, SOLUTION INTRAVENOUS CONTINUOUS
Status: DISCONTINUED | OUTPATIENT
Start: 2021-10-14 | End: 2021-10-14 | Stop reason: HOSPADM

## 2021-10-14 RX ORDER — ONDANSETRON 2 MG/ML
INJECTION INTRAMUSCULAR; INTRAVENOUS AS NEEDED
Status: DISCONTINUED | OUTPATIENT
Start: 2021-10-14 | End: 2021-10-14

## 2021-10-14 RX ORDER — HYDROMORPHONE HCL/PF 1 MG/ML
0.5 SYRINGE (ML) INJECTION
Status: DISCONTINUED | OUTPATIENT
Start: 2021-10-14 | End: 2021-10-14 | Stop reason: HOSPADM

## 2021-10-14 RX ORDER — FENTANYL CITRATE 50 UG/ML
INJECTION, SOLUTION INTRAMUSCULAR; INTRAVENOUS AS NEEDED
Status: DISCONTINUED | OUTPATIENT
Start: 2021-10-14 | End: 2021-10-14

## 2021-10-14 RX ORDER — SUCCINYLCHOLINE/SOD CL,ISO/PF 100 MG/5ML
SYRINGE (ML) INTRAVENOUS AS NEEDED
Status: DISCONTINUED | OUTPATIENT
Start: 2021-10-14 | End: 2021-10-14

## 2021-10-14 RX ORDER — TRANEXAMIC ACID 100 MG/ML
INJECTION, SOLUTION INTRAVENOUS AS NEEDED
Status: DISCONTINUED | OUTPATIENT
Start: 2021-10-14 | End: 2021-10-14

## 2021-10-14 RX ORDER — NEOSTIGMINE METHYLSULFATE 1 MG/ML
INJECTION INTRAVENOUS AS NEEDED
Status: DISCONTINUED | OUTPATIENT
Start: 2021-10-14 | End: 2021-10-14

## 2021-10-14 RX ORDER — KETOROLAC TROMETHAMINE 30 MG/ML
15 INJECTION, SOLUTION INTRAMUSCULAR; INTRAVENOUS EVERY 6 HOURS PRN
Status: DISCONTINUED | OUTPATIENT
Start: 2021-10-14 | End: 2021-10-14 | Stop reason: HOSPADM

## 2021-10-14 RX ORDER — KETAMINE HCL IN NACL, ISO-OSM 100MG/10ML
SYRINGE (ML) INJECTION AS NEEDED
Status: DISCONTINUED | OUTPATIENT
Start: 2021-10-14 | End: 2021-10-14

## 2021-10-14 RX ORDER — MAGNESIUM HYDROXIDE 1200 MG/15ML
LIQUID ORAL AS NEEDED
Status: DISCONTINUED | OUTPATIENT
Start: 2021-10-14 | End: 2021-10-14 | Stop reason: HOSPADM

## 2021-10-14 RX ORDER — MIDAZOLAM HYDROCHLORIDE 2 MG/2ML
INJECTION, SOLUTION INTRAMUSCULAR; INTRAVENOUS AS NEEDED
Status: DISCONTINUED | OUTPATIENT
Start: 2021-10-14 | End: 2021-10-14

## 2021-10-14 RX ORDER — GLYCOPYRROLATE 0.2 MG/ML
INJECTION INTRAMUSCULAR; INTRAVENOUS AS NEEDED
Status: DISCONTINUED | OUTPATIENT
Start: 2021-10-14 | End: 2021-10-14

## 2021-10-14 RX ORDER — METHYLERGONOVINE MALEATE 0.2 MG/1
0.2 TABLET ORAL EVERY 6 HOURS
Status: DISCONTINUED | OUTPATIENT
Start: 2021-10-14 | End: 2021-10-14 | Stop reason: HOSPADM

## 2021-10-14 RX ORDER — METHYLERGONOVINE MALEATE 0.2 MG/ML
INJECTION INTRAVENOUS AS NEEDED
Status: DISCONTINUED | OUTPATIENT
Start: 2021-10-14 | End: 2021-10-14

## 2021-10-14 RX ORDER — OXYTOCIN 10 [USP'U]/ML
INJECTION, SOLUTION INTRAMUSCULAR; INTRAVENOUS AS NEEDED
Status: DISCONTINUED | OUTPATIENT
Start: 2021-10-14 | End: 2021-10-14

## 2021-10-14 RX ORDER — FENTANYL CITRATE/PF 50 MCG/ML
25 SYRINGE (ML) INJECTION
Status: DISCONTINUED | OUTPATIENT
Start: 2021-10-14 | End: 2021-10-14 | Stop reason: HOSPADM

## 2021-10-14 RX ORDER — OXYCODONE HYDROCHLORIDE 5 MG/1
5 TABLET ORAL EVERY 4 HOURS PRN
Status: DISCONTINUED | OUTPATIENT
Start: 2021-10-14 | End: 2021-10-14 | Stop reason: HOSPADM

## 2021-10-14 RX ORDER — MISOPROSTOL 100 UG/1
TABLET ORAL AS NEEDED
Status: DISCONTINUED | OUTPATIENT
Start: 2021-10-14 | End: 2021-10-14 | Stop reason: HOSPADM

## 2021-10-14 RX ORDER — PROPOFOL 10 MG/ML
INJECTION, EMULSION INTRAVENOUS AS NEEDED
Status: DISCONTINUED | OUTPATIENT
Start: 2021-10-14 | End: 2021-10-14

## 2021-10-14 RX ORDER — ROCURONIUM BROMIDE 10 MG/ML
INJECTION, SOLUTION INTRAVENOUS AS NEEDED
Status: DISCONTINUED | OUTPATIENT
Start: 2021-10-14 | End: 2021-10-14

## 2021-10-14 RX ORDER — DOCUSATE SODIUM 100 MG/1
100 CAPSULE, LIQUID FILLED ORAL 2 TIMES DAILY
Status: DISCONTINUED | OUTPATIENT
Start: 2021-10-14 | End: 2021-10-14

## 2021-10-14 RX ORDER — ACETAMINOPHEN 325 MG/1
650 TABLET ORAL EVERY 6 HOURS
Status: DISCONTINUED | OUTPATIENT
Start: 2021-10-14 | End: 2021-10-14 | Stop reason: HOSPADM

## 2021-10-14 RX ADMIN — MIDAZOLAM HYDROCHLORIDE 2 MG: 1 INJECTION, SOLUTION INTRAMUSCULAR; INTRAVENOUS at 01:43

## 2021-10-14 RX ADMIN — OXYTOCIN 10 UNITS: 10 INJECTION, SOLUTION INTRAMUSCULAR; INTRAVENOUS at 02:21

## 2021-10-14 RX ADMIN — NEOSTIGMINE METHYLSULFATE 3 MG: 1 INJECTION INTRAVENOUS at 02:33

## 2021-10-14 RX ADMIN — ONDANSETRON 4 MG: 2 INJECTION INTRAMUSCULAR; INTRAVENOUS at 01:49

## 2021-10-14 RX ADMIN — SODIUM CHLORIDE, SODIUM LACTATE, POTASSIUM CHLORIDE, AND CALCIUM CHLORIDE 150 ML/HR: .6; .31; .03; .02 INJECTION, SOLUTION INTRAVENOUS at 11:36

## 2021-10-14 RX ADMIN — ROCURONIUM BROMIDE 20 MG: 10 SOLUTION INTRAVENOUS at 01:58

## 2021-10-14 RX ADMIN — PROPOFOL 50 MG: 10 INJECTION, EMULSION INTRAVENOUS at 01:43

## 2021-10-14 RX ADMIN — FENTANYL CITRATE 50 MCG: 50 INJECTION, SOLUTION INTRAMUSCULAR; INTRAVENOUS at 01:43

## 2021-10-14 RX ADMIN — GLYCOPYRROLATE 0.6 MG: 0.2 INJECTION, SOLUTION INTRAMUSCULAR; INTRAVENOUS at 02:33

## 2021-10-14 RX ADMIN — ACETAMINOPHEN 650 MG: 325 TABLET, FILM COATED ORAL at 14:05

## 2021-10-14 RX ADMIN — SODIUM CHLORIDE, SODIUM LACTATE, POTASSIUM CHLORIDE, AND CALCIUM CHLORIDE: .6; .31; .03; .02 INJECTION, SOLUTION INTRAVENOUS at 01:30

## 2021-10-14 RX ADMIN — OXYCODONE HYDROCHLORIDE 5 MG: 5 TABLET ORAL at 16:43

## 2021-10-14 RX ADMIN — TRANEXAMIC ACID 1000 MG: 1 INJECTION, SOLUTION INTRAVENOUS at 02:22

## 2021-10-14 RX ADMIN — METHYLERGONOVINE 0.2 MG: 0.2 TABLET ORAL at 09:55

## 2021-10-14 RX ADMIN — METHYLERGONOVINE MALEATE 0.2 MG: 0.2 INJECTION, SOLUTION INTRAMUSCULAR; INTRAVENOUS at 02:04

## 2021-10-14 RX ADMIN — DEXAMETHASONE SODIUM PHOSPHATE 4 MG: 10 INJECTION, SOLUTION INTRAMUSCULAR; INTRAVENOUS at 01:49

## 2021-10-14 RX ADMIN — ACETAMINOPHEN 650 MG: 325 TABLET, FILM COATED ORAL at 08:49

## 2021-10-14 RX ADMIN — Medication 70 MG: at 01:43

## 2021-10-14 RX ADMIN — Medication 100 MG: at 01:43

## 2021-10-14 RX ADMIN — DOXYCYCLINE 200 MG: 100 INJECTION, POWDER, LYOPHILIZED, FOR SOLUTION INTRAVENOUS at 01:38

## 2021-10-14 RX ADMIN — SODIUM CHLORIDE, SODIUM LACTATE, POTASSIUM CHLORIDE, AND CALCIUM CHLORIDE: .6; .31; .03; .02 INJECTION, SOLUTION INTRAVENOUS at 02:41

## 2021-10-14 RX ADMIN — METHYLERGONOVINE 0.2 MG: 0.2 TABLET ORAL at 14:06

## 2021-10-14 RX ADMIN — KETOROLAC TROMETHAMINE 15 MG: 30 INJECTION, SOLUTION INTRAMUSCULAR at 09:54

## 2021-10-16 LAB
ABO GROUP BLD BPU: NORMAL
ABO GROUP BLD BPU: NORMAL
BPU ID: NORMAL
BPU ID: NORMAL
CROSSMATCH: NORMAL
CROSSMATCH: NORMAL
UNIT DISPENSE STATUS: NORMAL
UNIT DISPENSE STATUS: NORMAL
UNIT PRODUCT CODE: NORMAL
UNIT PRODUCT CODE: NORMAL
UNIT PRODUCT VOLUME: 350 ML
UNIT PRODUCT VOLUME: 350 ML
UNIT RH: NORMAL
UNIT RH: NORMAL

## 2021-10-18 DIAGNOSIS — Z30.41 ENCOUNTER FOR SURVEILLANCE OF CONTRACEPTIVE PILLS: Primary | ICD-10-CM

## 2021-10-18 RX ORDER — IBUPROFEN 600 MG/1
600 TABLET ORAL EVERY 6 HOURS PRN
Qty: 30 TABLET | Refills: 1
Start: 2021-10-18

## 2021-10-18 RX ORDER — ACETAMINOPHEN 325 MG/1
650 TABLET ORAL EVERY 4 HOURS PRN
Qty: 30 TABLET | Refills: 1
Start: 2021-10-18

## 2021-10-18 RX ORDER — ACETAMINOPHEN AND CODEINE PHOSPHATE 120; 12 MG/5ML; MG/5ML
1 SOLUTION ORAL DAILY
Qty: 30 TABLET | Refills: 5 | Status: SHIPPED | OUTPATIENT
Start: 2021-10-18 | End: 2022-05-26 | Stop reason: SDUPTHER

## 2021-10-20 ENCOUNTER — TELEPHONE (OUTPATIENT)
Dept: OBGYN CLINIC | Facility: HOSPITAL | Age: 31
End: 2021-10-20

## 2021-10-20 LAB
BASE EXCESS BLDA CALC-SCNC: -6 MMOL/L (ref -2–3)
GLUCOSE SERPL-MCNC: 138 MG/DL (ref 65–140)
HCO3 BLDA-SCNC: 19.7 MMOL/L (ref 24–30)
HCT VFR BLD CALC: 26 % (ref 34.8–46.1)
HGB BLDA-MCNC: 8.8 G/DL (ref 11.5–15.4)
PCO2 BLD: 21 MMOL/L (ref 21–32)
PCO2 BLD: 37.3 MM HG (ref 42–50)
PH BLD: 7.33 [PH] (ref 7.3–7.4)
PO2 BLD: 64 MM HG (ref 35–45)
POTASSIUM BLD-SCNC: 4.3 MMOL/L (ref 3.5–5.3)
SAO2 % BLD FROM PO2: 91 % (ref 60–85)
SODIUM BLD-SCNC: 138 MMOL/L (ref 136–145)
SPECIMEN SOURCE: ABNORMAL

## 2021-10-25 ENCOUNTER — OFFICE VISIT (OUTPATIENT)
Dept: OBGYN CLINIC | Facility: HOSPITAL | Age: 31
End: 2021-10-25
Payer: COMMERCIAL

## 2021-10-25 ENCOUNTER — HOSPITAL ENCOUNTER (OUTPATIENT)
Dept: RADIOLOGY | Facility: HOSPITAL | Age: 31
Discharge: HOME | End: 2021-10-25
Payer: COMMERCIAL

## 2021-10-25 VITALS — SYSTOLIC BLOOD PRESSURE: 93 MMHG | DIASTOLIC BLOOD PRESSURE: 62 MMHG | HEART RATE: 112 BPM

## 2021-10-25 DIAGNOSIS — M25.572 PAIN, JOINT, ANKLE AND FOOT, LEFT: ICD-10-CM

## 2021-10-25 DIAGNOSIS — S82.392D CLOSED FRACTURE OF POSTERIOR MALLEOLUS OF LEFT TIBIA WITH ROUTINE HEALING, SUBSEQUENT ENCOUNTER: ICD-10-CM

## 2021-10-25 DIAGNOSIS — M25.572 PAIN, JOINT, ANKLE AND FOOT, LEFT: Primary | ICD-10-CM

## 2021-10-25 PROCEDURE — 73610 X-RAY EXAM OF ANKLE: CPT

## 2021-10-25 PROCEDURE — 99213 OFFICE O/P EST LOW 20 MIN: CPT | Performed by: PHYSICIAN ASSISTANT

## 2021-11-07 ENCOUNTER — TELEPHONE (OUTPATIENT)
Dept: OTHER | Facility: OTHER | Age: 31
End: 2021-11-07

## 2021-12-15 ENCOUNTER — TELEPHONE (OUTPATIENT)
Dept: OBGYN CLINIC | Facility: CLINIC | Age: 31
End: 2021-12-15

## 2022-05-25 NOTE — PROGRESS NOTES
ASSESSMENT & PLAN: Magdalena Galarza is a 29 y o  [id number] with normal gynecologic exam     1   Routine well woman exam done today  2  Pap and HPV:  The patient's last pap and hpv was  1 year ago  It was normal  Reports No abnormal paps  Pap and cotesting was done today  Current ASCCP Guidelines reviewed  3   The following were reviewed in today's visit: breast self exam, STD testing, exercise and healthy diet  4  Gardisil vaccine - has had    BMI Counseling: Body mass index is 34 33 kg/m²  The BMI is above normal  Nutrition recommendations include reducing portion sizes, 3-5 servings of fruits/vegetables daily, decreasing soda and/or juice intake, moderation in carbohydrate intake and reducing intake of cholesterol  Exercise recommendations include exercising 3-5 times per week  Depression Screening Follow-up Plan: Patient's depression screening was positive with a PHQ-2 score of 0  Their PHQ-9 score was 2 Clinically patient does not have depression  No treatment is required  Tobacco Cessation Counseling: Tobacco cessation counseling was not provided  The patient is sincerely urged to quit consumption of tobacco  She is not ready to quit tobacco  The numerous health risks of tobacco consumption were discussed  Will quit when kids are older    CC:  Annual Gynecologic Examination    HPI: Magdalena Galarza is a 29 y o  [id number] who presents for annual gynecologic examination  Denies abnormal pap hx  She is a new pt to us  Was on Micronor and desires to restart  She uses tobacco, and has hx of migraines  Patient with history of D and E done 10/14/2021     Health Maintenance:    She wears her seatbelt routinely  She does perform irregular monthly self breast exams  She feels safe at home       Past Medical History:   Diagnosis Date    Anemia     Migraine     Obesity     Urinary tract infection     Varicella     had chicken pox       Past Surgical History:   Procedure Laterality Date    DILATION AND EVACUATION N/A 10/14/2021    Procedure: DILATATION AND EVACUATION (D&E) (# OF WEEKS), suction D&C for remaining POC;  Surgeon: Julia Hassan MD;  [facility]: [facility];  Service: Gynecology    WISDOM TOOTH EXTRACTION         Past OB/Gyn History:  OB History        10    Para   5    Term   5       0    AB   5    Living   5       SAB   1    IAB   4    Ectopic        Multiple   0    Live Births   5           Obstetric Comments     4 Terminations           Pt does not have menstrual issues  Menses are regular lasts 5-7 days, can be heavy and light   History of sexually transmitted infection: No   History of abnormal pap smears: No      Patient is currently sexually active  heterosexual   The current method of family planning is none  Family History   Problem Relation Age of Onset    Diabetes Mother     Hypertension Mother     Hypertension Father     No Known Problems Sister     Asthma Brother     No Known Problems Daughter     No Known Problems Son     No Known Problems Maternal Grandmother     No Known Problems Maternal Grandfather     Glaucoma Paternal Grandmother     Dementia Paternal Grandfather     No Known Problems Sister     Bronchiolitis Daughter     No Known Problems Daughter        Social History:  Social History     Socioeconomic History    Marital status: Single     Spouse name: Isaiah Simmonds    Number of children: 3    Years of education: 15    Highest education level: High school graduate   Occupational History    Occupation:    Tobacco Use    Smoking status: Current Every Day Smoker    Smokeless tobacco: Never Used   Vaping Use    Vaping Use: Some days    Start date: 2013   Substance and Sexual Activity    Alcohol use:  Yes    Drug use: Yes     Types: Marijuana     Comment: unsure of last use    Sexual activity: Yes     Partners: Male     Birth control/protection: None   Other Topics Concern    Not on file   Social History Narrative    Not on file     Social Determinants of Health     Financial Resource Strain: Not on file   Food Insecurity: Not on file   Transportation Needs: Not on file   Physical Activity: Not on file   Stress: Not on file   Social Connections: Not on file   Intimate Partner Violence: Not on file   Housing Stability: Not on file     Presently lives with family  Patient is engaged  Patient is currently employed     No Known Allergies      Current Outpatient Medications:     acetaminophen (TYLENOL) 325 mg tablet, Take 2 tablets (650 mg total) by mouth every 4 (four) hours as needed for mild pain or headaches, Disp: 30 tablet, Rfl: 1    ibuprofen (MOTRIN) 600 mg tablet, Take 1 tablet (600 mg total) by mouth every 6 (six) hours as needed for mild pain, Disp: 30 tablet, Rfl: 1    norethindrone (MICRONOR) 0 35 MG tablet, Take 1 tablet (0 35 mg total) by mouth daily (Patient not taking: Reported on 10/13/2021), Disp: 30 tablet, Rfl: 3    norethindrone (Ortho Micronor) 0 35 MG tablet, Take 1 tablet (0 35 mg total) by mouth daily, Disp: 30 tablet, Rfl: 5    Prenatal Vit-Fe Fumarate-FA (PRENATAL VITAMINS PO), Take by mouth (Patient not taking: Reported on 10/13/2021), Disp: , Rfl:       Review of Systems  Constitutional :no fever, feels well, no tiredness, no recent weight gain or loss  ENT: no ear ache, no loss of hearing, no nosebleeds or nasal discharge, no sore throat or hoarseness  Cardiovascular: no complaints of slow or fast heart beat, no chest pain, no palpitations, no leg claudication or lower extremity edema    Respiratory: no complaints of shortness of shortness of breath, no SCHREIBER  Breasts:no complaints of breast pain, breast lump, or nipple discharge  Gastrointestinal: no complaints of abdominal pain, constipation, nausea, vomiting, or diarrhea or bloody stools  Genitourinary : no complaints of dysuria, incontinence, pelvic pain, no dysmenorrhea, vaginal discharge or abnormal vaginal bleeding and as noted in HPI   Musculoskeletal: no complaints of arthralgia, no myalgia, no joint swelling or stiffness, no limb pain or swelling  Integumentary: no complaints of skin rash or lesion, itching or dry skin  Neurological: no complaints of headache, no confusion, no numbness or tingling, no dizziness or fainting    Objective      There were no vitals taken for this visit  General:   appears stated age, cooperative, alert normal mood and affect   Neck: normal, supple,trachea midline, no masses   Heart: regular rate and rhythm, S1, S2 normal, no murmur, click, rub or gallop   Lungs: clear to auscultation bilaterally   Breasts: normal appearance, no masses or tenderness, Inspection negative, No nipple retraction or dimpling, No nipple discharge or bleeding, No axillary or supraclavicular adenopathy, Normal to palpation without dominant masses, Taught monthly breast self examination   Abdomen: soft, non-tender, without masses or organomegaly   Vulva: normal female genitalia, Bartholin's, Urethra, [place] normal   Vagina: normal vagina, no discharge, exudate, lesion, or erythema   Urethra: normal   Cervix: PAP done  Friable  Nontender  Uterus: normal size, contour, position, consistency, mobility, non-tender   Adnexa: normal adnexa and no mass, fullness, tenderness   Lymphatic palpation of lymph nodes in neck, axilla, groin and/or other locations: no lymphadenopathy or masses noted   Skin normal skin turgor and no rashes     Psychiatric orientation to person, place, and time: normal  mood and affect: normal

## 2022-05-26 ENCOUNTER — OFFICE VISIT (OUTPATIENT)
Dept: OBGYN CLINIC | Facility: CLINIC | Age: 32
End: 2022-05-26

## 2022-05-26 VITALS
BODY MASS INDEX: 34.15 KG/M2 | HEART RATE: 78 BPM | WEIGHT: 200 LBS | SYSTOLIC BLOOD PRESSURE: 102 MMHG | HEIGHT: 64 IN | DIASTOLIC BLOOD PRESSURE: 72 MMHG

## 2022-05-26 DIAGNOSIS — Z30.011 ENCOUNTER FOR INITIAL PRESCRIPTION OF CONTRACEPTIVE PILLS: ICD-10-CM

## 2022-05-26 DIAGNOSIS — Z01.419 ENCOUNTER FOR GYNECOLOGICAL EXAMINATION WITHOUT ABNORMAL FINDING: ICD-10-CM

## 2022-05-26 PROBLEM — Z3A.38 38 WEEKS GESTATION OF PREGNANCY: Status: RESOLVED | Noted: 2020-02-04 | Resolved: 2022-05-26

## 2022-05-26 PROBLEM — O99.210 OBESITY AFFECTING PREGNANCY: Status: RESOLVED | Noted: 2020-02-04 | Resolved: 2022-05-26

## 2022-05-26 PROBLEM — Z3A.37 PREGNANCY WITH 37 WEEKS COMPLETED GESTATION: Status: RESOLVED | Noted: 2020-07-01 | Resolved: 2022-05-26

## 2022-05-26 PROBLEM — Z87.59 HISTORY OF PRIOR PREGNANCY WITH SGA NEWBORN: Status: RESOLVED | Noted: 2020-02-04 | Resolved: 2022-05-26

## 2022-05-26 PROCEDURE — G0476 HPV COMBO ASSAY CA SCREEN: HCPCS | Performed by: NURSE PRACTITIONER

## 2022-05-26 PROCEDURE — G0145 SCR C/V CYTO,THINLAYER,RESCR: HCPCS | Performed by: NURSE PRACTITIONER

## 2022-05-26 PROCEDURE — 99385 PREV VISIT NEW AGE 18-39: CPT | Performed by: NURSE PRACTITIONER

## 2022-05-26 RX ORDER — ACETAMINOPHEN AND CODEINE PHOSPHATE 120; 12 MG/5ML; MG/5ML
1 SOLUTION ORAL DAILY
Qty: 28 TABLET | Refills: 12 | Status: SHIPPED | OUTPATIENT
Start: 2022-05-26 | End: 2022-06-23

## 2022-05-30 LAB
HPV HR 12 DNA CVX QL NAA+PROBE: NEGATIVE
HPV16 DNA CVX QL NAA+PROBE: NEGATIVE
HPV18 DNA CVX QL NAA+PROBE: NEGATIVE

## 2022-06-02 LAB
LAB AP GYN PRIMARY INTERPRETATION: NORMAL
Lab: NORMAL
PATH INTERP SPEC-IMP: NORMAL

## 2022-06-07 ENCOUNTER — TELEPHONE (OUTPATIENT)
Dept: OBGYN CLINIC | Facility: CLINIC | Age: 32
End: 2022-06-07

## 2022-06-07 NOTE — TELEPHONE ENCOUNTER
----- Message from Roselia Dowell, [address] sent at 6/6/2022 10:50 AM EDT -----  Please inform patient that her Pap and HPV were  both negative  Her Pap suggest BV, does not to be treated if she is not having symptoms    Thank you

## 2022-06-07 NOTE — TELEPHONE ENCOUNTER
Message left for patient that results are available in [facility]  Encouraged to call office with any questions/concerns

## 2022-07-20 ENCOUNTER — VBI (OUTPATIENT)
Dept: ADMINISTRATIVE | Facility: OTHER | Age: 32
End: 2022-07-20

## 2023-06-01 NOTE — PROGRESS NOTES
Progress Note - OB/GYN   Lan Child 31 y o  female MRN: [id number]  Unit/Bed#: -01 Encounter: [id number]    Assessment:  PPD#2 s/p Spontaneous Vaginal Delivery, stable  Case management has signed off on her, UDS positive for meth and amphetamines  Continues to deny any sort of drug use    Plan:  Continue routine post partum care  -Encourage ambulation   - Encourage breastfeeding  Continue current meds   Future contraception   - Pt desires IUD but will not be able to get one until 6 weeks PP, will see if she will agree to Depo before discharge  Disposition   - Anticipate discharge home today    Subjective/Objective   Chief Complaint:     PPD#2 s/p Spontaneous Vaginal Delivery    Subjective:     Pain: no  Tolerating PO: yes  Voiding: yes  Flatus: yes  BM: yes  Ambulating: yes  Breastfeeding: Breastfeeding  Chest pain: no  Shortness of breath: no  Leg pain: no  Lochia: minimal    Objective:     Vitals:  Vitals:    07/08/20 0400 07/08/20 0942 07/08/20 1622 07/09/20 0002   BP: 111/73 123/82 116/66 140/71   BP Location: Left arm  Left arm    Pulse: 77 71 76 65   Resp: 18 18 18 16   Temp: 98 3 °F (36 8 °C) 98 2 °F (36 8 °C) 98 3 °F (36 8 °C) 98 1 °F (36 7 °C)   TempSrc: Oral Oral Oral Oral   SpO2: 96%      Weight:       Height:           Physical Exam:       Physical Exam:   GEN: appears well, alert and oriented x 3, pleasant and cooperative   CV: +S1, +S2, no murmurs/rubs/gallops appreciated  RESP: no labored breathing  ABDOMEN: soft, no tenderness, no distention, Uterine fundus firm and non-tender, at the umbilicus   EXTREMITIES: non-tender  NEURO Alert and oriented to person, place, and time         Lab Results   Component Value Date    WBC 5 61 07/07/2020    HGB 9 7 (L) 07/07/2020    HCT 29 9 (L) 07/07/2020    MCV 83 07/07/2020     07/07/2020         Janey Elizabeth DO, PGY-1  Obstetrics & Gynecology  07/09/20 room air

## 2023-09-18 ENCOUNTER — OFFICE VISIT (OUTPATIENT)
Dept: INTERNAL MEDICINE CLINIC | Facility: CLINIC | Age: 33
End: 2023-09-18
Payer: COMMERCIAL

## 2023-09-18 VITALS
BODY MASS INDEX: 34.49 KG/M2 | WEIGHT: 202 LBS | HEART RATE: 78 BPM | DIASTOLIC BLOOD PRESSURE: 76 MMHG | HEIGHT: 64 IN | SYSTOLIC BLOOD PRESSURE: 121 MMHG | TEMPERATURE: 97.4 F | OXYGEN SATURATION: 99 %

## 2023-09-18 DIAGNOSIS — Z11.1 SCREENING EXAMINATION FOR PULMONARY TUBERCULOSIS: Primary | ICD-10-CM

## 2023-09-18 DIAGNOSIS — Z02.1 PRE-EMPLOYMENT EXAMINATION: ICD-10-CM

## 2023-09-18 PROCEDURE — 99499 UNLISTED E&M SERVICE: CPT | Performed by: INTERNAL MEDICINE

## 2023-09-18 PROCEDURE — 86580 TB INTRADERMAL TEST: CPT

## 2023-09-18 NOTE — PROGRESS NOTES
Assessment/Plan:    Diagnoses and all orders for this visit:    Screening examination for pulmonary tuberculosis  -     TB Skin Test    Pre-employment examination              There are no Patient Instructions on file for this visit. Subjective:      Patient ID: Manav Nunn is a 34 y.o. female    Pt comes for pre employment physical exam. Denies any chronic medical conditons, any subjective complaints today          Current Outpatient Medications:   •  acetaminophen (TYLENOL) 325 mg tablet, Take 2 tablets (650 mg total) by mouth every 4 (four) hours as needed for mild pain or headaches (Patient not taking: No sig reported), Disp: 30 tablet, Rfl: 1  •  ibuprofen (MOTRIN) 600 mg tablet, Take 1 tablet (600 mg total) by mouth every 6 (six) hours as needed for mild pain (Patient not taking: No sig reported), Disp: 30 tablet, Rfl: 1  •  norethindrone (Ortho Micronor) 0.35 MG tablet, Take 1 tablet (0.35 mg total) by mouth daily for 28 days, Disp: 28 tablet, Rfl: 12  •  Prenatal Vit-Fe Fumarate-FA (PRENATAL VITAMINS PO), Take by mouth (Patient not taking: No sig reported), Disp: , Rfl:      Past Medical History:   Diagnosis Date   • Anemia    • History of prior pregnancy with SGA  2020   • Migraine    • Obesity    • Pregnancy with 37 weeks completed gestation 2020   •  (spontaneous vaginal delivery) 2020   • Urinary tract infection    • Varicella     had chicken pox         Past Surgical History:   Procedure Laterality Date   • DILATION AND EVACUATION N/A 10/14/2021    Procedure: DILATATION AND EVACUATION (D&E) (# OF WEEKS), suction D&C for remaining POC;  Surgeon: Dariana Singh MD;  Location: [facility];  Service: Gynecology   • WISDOM TOOTH EXTRACTION           No Known Allergies    No results found for this or any previous visit (from the past 1008 hour(s)).     The following portions of the patient's history were reviewed and updated as appropriate: allergies, current medications, past family history, past medical history, past social history, past surgical history and problem list.     Review of Systems   Constitutional: Negative for appetite change, chills, diaphoresis, fatigue, fever and unexpected weight change. Respiratory: Negative for apnea, cough, choking, chest tightness, shortness of breath, wheezing and stridor. Cardiovascular: Negative for chest pain, palpitations and leg swelling. Gastrointestinal: Negative for abdominal distention, abdominal pain, anal bleeding, blood in stool, constipation, diarrhea, nausea and vomiting. Genitourinary: Negative for decreased urine volume, difficulty urinating, frequency and urgency. Musculoskeletal: Negative for arthralgias, back pain and myalgias. Neurological: Negative for dizziness, light-headedness, numbness and headaches. Objective:      Vitals:    09/18/23 1031   BP: 121/76   Pulse: 78   Temp: (!) 97.4 °F (36.3 °C)   SpO2: 99%          Physical Exam  Abdominal:      Palpations: Abdomen is soft.

## 2023-09-20 LAB
INDURATION: 0 MM
TB SKIN TEST: NEGATIVE

## 2023-10-19 NOTE — PROGRESS NOTES
Assessment/Plan:     No problem-specific Assessment & Plan notes found for this encounter. Diagnoses and all orders for this visit:    Missed ab  -     miSOPROStol (Cytotec) 200 mcg tablet; Take all 4 tablets once    Other orders  -     Multiple Vitamin (MULTIVITAMIN PO); Take by mouth      RTO in one week for US to assess for complete resolution of pregnancy. Subjective:      Patient ID: Payal Piña is a 34 y.o. female who presents for dating and viability US. Her LMP was 08/08/23, c/w 11 wk 1 days, [facility] 05/14/24. She offers no complaints today. She had some light brown spotting. TV US reveals a non-viable  IUP  CRL 2.05 cm c/w 8 wk 5 days  FHT not present        HPI    The following portions of the patient's history were reviewed and updated as appropriate: allergies, current medications, past family history, past medical history, past social history, past surgical history and problem list.    Review of Systems      Objective:      /82 (BP Location: Right arm, Patient Position: Sitting, Cuff Size: Adult)   Pulse 94   Resp 18   Ht 5' 4" (1.626 m)   Wt 93.9 kg (207 lb)   LMP 08/08/2023 (Exact Date)   BMI 35.53 kg/m²          Physical Exam  Constitutional:       Appearance: Normal appearance. Pulmonary:      Effort: Pulmonary effort is normal.   Neurological:      General: No focal deficit present. Mental Status: She is alert and oriented to person, place, and time.    Psychiatric:         Mood and Affect: Mood normal.

## 2023-10-25 ENCOUNTER — ULTRASOUND (OUTPATIENT)
Dept: OBGYN CLINIC | Facility: CLINIC | Age: 33
End: 2023-10-25

## 2023-10-25 VITALS
BODY MASS INDEX: 35.34 KG/M2 | HEIGHT: 64 IN | SYSTOLIC BLOOD PRESSURE: 120 MMHG | RESPIRATION RATE: 18 BRPM | HEART RATE: 94 BPM | DIASTOLIC BLOOD PRESSURE: 82 MMHG | WEIGHT: 207 LBS

## 2023-10-25 DIAGNOSIS — O02.1 MISSED AB: Primary | ICD-10-CM

## 2023-10-25 RX ORDER — MISOPROSTOL 200 UG/1
TABLET ORAL
Qty: 4 TABLET | Refills: 1 | Status: SHIPPED | OUTPATIENT
Start: 2023-10-25

## 2024-02-06 ENCOUNTER — OFFICE VISIT (OUTPATIENT)
Dept: INTERNAL MEDICINE CLINIC | Facility: CLINIC | Age: 34
End: 2024-02-06
Payer: COMMERCIAL

## 2024-02-06 VITALS
OXYGEN SATURATION: 97 % | SYSTOLIC BLOOD PRESSURE: 114 MMHG | BODY MASS INDEX: 35.17 KG/M2 | WEIGHT: 206 LBS | DIASTOLIC BLOOD PRESSURE: 79 MMHG | HEART RATE: 77 BPM | HEIGHT: 64 IN | TEMPERATURE: 97.7 F

## 2024-02-06 DIAGNOSIS — Z13.220 SCREENING FOR LIPID DISORDERS: ICD-10-CM

## 2024-02-06 DIAGNOSIS — E66.09 CLASS 2 OBESITY DUE TO EXCESS CALORIES WITHOUT SERIOUS COMORBIDITY WITH BODY MASS INDEX (BMI) OF 35.0 TO 35.9 IN ADULT: ICD-10-CM

## 2024-02-06 DIAGNOSIS — F41.9 ANXIETY: ICD-10-CM

## 2024-02-06 DIAGNOSIS — Z13.21 ENCOUNTER FOR VITAMIN DEFICIENCY SCREENING: ICD-10-CM

## 2024-02-06 DIAGNOSIS — Z13.1 SCREENING FOR DIABETES MELLITUS: ICD-10-CM

## 2024-02-06 DIAGNOSIS — Z00.00 ANNUAL PHYSICAL EXAM: Primary | ICD-10-CM

## 2024-02-06 DIAGNOSIS — Z76.89 ESTABLISHING CARE WITH NEW DOCTOR, ENCOUNTER FOR: ICD-10-CM

## 2024-02-06 DIAGNOSIS — Z13.29 SCREENING FOR THYROID DISORDER: ICD-10-CM

## 2024-02-06 DIAGNOSIS — Z72.0 TOBACCO USE: ICD-10-CM

## 2024-02-06 DIAGNOSIS — Z82.49 FAMILY HISTORY OF EARLY CAD: ICD-10-CM

## 2024-02-06 DIAGNOSIS — Z13.6 SCREENING FOR CARDIOVASCULAR CONDITION: ICD-10-CM

## 2024-02-06 PROBLEM — E66.812 CLASS 2 OBESITY DUE TO EXCESS CALORIES WITHOUT SERIOUS COMORBIDITY WITH BODY MASS INDEX (BMI) OF 35.0 TO 35.9 IN ADULT: Status: ACTIVE | Noted: 2024-02-06

## 2024-02-06 PROCEDURE — 99203 OFFICE O/P NEW LOW 30 MIN: CPT | Performed by: INTERNAL MEDICINE

## 2024-02-06 PROCEDURE — 99385 PREV VISIT NEW AGE 18-39: CPT | Performed by: INTERNAL MEDICINE

## 2024-02-06 RX ORDER — HYDROXYZINE HYDROCHLORIDE 10 MG/1
10 TABLET, FILM COATED ORAL EVERY 6 HOURS PRN
Qty: 30 TABLET | Refills: 0 | Status: SHIPPED | OUTPATIENT
Start: 2024-02-06

## 2024-02-06 NOTE — PROGRESS NOTES
ADULT ANNUAL PHYSICAL  [facility] INTERNAL MEDICINE    NAME: Ashley Noyola  AGE: 34 y.o. SEX: female  : 1990     DATE: 2024     Assessment and Plan:     Problem List Items Addressed This Visit       Class 2 obesity due to excess calories without serious comorbidity with body mass index (BMI) of 35.0 to 35.9 in adult    Relevant Orders    Lipid panel    Comprehensive metabolic panel    CBC and Platelet    Ambulatory Referral to Weight Management    Family history of early CAD    Relevant Orders    Ambulatory Referral to Weight Management    Tobacco use    Anxiety    Relevant Medications    hydrOXYzine HCL (ATARAX) 10 mg tablet     Other Visit Diagnoses       Annual physical exam    -  Primary    Relevant Orders    Lipid panel    TSH, 3rd generation with Free T4 reflex    Screening for thyroid disorder        Relevant Orders    TSH, 3rd generation with Free T4 reflex    Screening for lipid disorders        Relevant Orders    Lipid panel    Comprehensive metabolic panel    CBC and Platelet    Encounter for vitamin deficiency screening        Relevant Orders    Vitamin D 25 hydroxy    Screening for diabetes mellitus        Screening for cardiovascular condition        Relevant Orders    Lipid panel    Comprehensive metabolic panel    Establishing care with new doctor, encounter for              Patient comes to establish care, denies subjective complaints today, chronic medical conditions or medication intake.  Obesity-discussed lifestyle modification, patient plans to start going to the gym, also agreeable to weight loss management referral.  Tobacco use-smokes 4 to 5 cigarettes for the last 2 to 5 years, previously smoked a pack a day for close to 7 years.  Encourage smoking cessation, plans to use OTC Nicorette gum.  Alcohol use-drinks a bottle of wine 2-3 times in a week, encourage cessation patient agreeable.  Anxiety-due to multiple family stressors,  discussed medications agreeable for as needed Atarax.  Family history of early CAD-reports mother had diabetes hypertension and  of heart disease at age 45.  Discussed need to modify cardiovascular risk factors as above Follow-up with blood work as above in 6 months    Up-to-date on Pap smear-2022        Immunizations and preventive care screenings were discussed with patient today. Appropriate education was printed on patient's after visit summary.    Counseling:  Alcohol/drug use: discussed moderation in alcohol intake, the recommendations for healthy alcohol use, and avoidance of illicit drug use.      Tobacco Cessation Counseling: Tobacco cessation counseling was provided. The patient is sincerely urged to quit consumption of tobacco. She is ready to quit tobacco. Medication options discussed. Nicotine gum was prescribed.         Return in about 6 months (around 2024) for Next scheduled follow up.     Chief Complaint:     Chief Complaint   Patient presents with    Physical Exam     Annual physical/ check up      History of Present Illness:     Adult Annual Physical   Patient here for a comprehensive physical exam. The patient reports problems - anxiety .    Diet and Physical Activity  Diet/Nutrition: poor diet.   Exercise: no formal exercise.      Depression Screening  PHQ-2/9 Depression Screening           General Health  Sleep: sleeps well.   Hearing: normal - bilateral.  Vision: no vision problems and wears contacts.   Dental: no dental visits for >1 year.       /GYN Health  Follows with gynecology? yes   Last menstrual period: 24  Contraceptive method:  none .  History of STDs?: no.     Advanced Care Planning  Do you have an advanced directive? no  Do you have a durable medical power of ? no     Review of Systems:     Review of Systems   Constitutional:  Negative for appetite change, chills, diaphoresis, fatigue, fever and unexpected weight change.   Respiratory:  Negative for  apnea, cough, choking, chest tightness, shortness of breath, wheezing and stridor.    Cardiovascular:  Negative for chest pain, palpitations and leg swelling.   Gastrointestinal:  Negative for abdominal distention, abdominal pain, anal bleeding, blood in stool, constipation, diarrhea, nausea and vomiting.   Genitourinary:  Negative for decreased urine volume, difficulty urinating, frequency and urgency.   Musculoskeletal:  Negative for arthralgias, back pain and myalgias.   Neurological:  Negative for dizziness, light-headedness, numbness and headaches.      Past Medical History:     Past Medical History:   Diagnosis Date    Anemia     History of prior pregnancy with SGA  2020    Migraine     Obesity     Pregnancy with 37 weeks completed gestation 2020     (spontaneous vaginal delivery) 2020    Urinary tract infection     Varicella     had chicken pox      Past Surgical History:     Past Surgical History:   Procedure Laterality Date    DILATION AND EVACUATION N/A 10/14/2021    Procedure: DILATATION AND EVACUATION (D&E) (# OF WEEKS), suction D&C for remaining POC;  Surgeon: Dian Pedro MD;  [facility];  Service: Gynecology    WISDOM TOOTH EXTRACTION        Social History:     Social History     Socioeconomic History    Marital status: Single     Spouse name: Manav Douglas    Number of children: 4    Years of education: 12    Highest education level: High school graduate   Occupational History    Occupation:    Tobacco Use    Smoking status: Every Day     Types: Cigarettes    Smokeless tobacco: Never   Vaping Use    Vaping status: Never Used   Substance and Sexual Activity    Alcohol use: Yes     Alcohol/week: 2.0 standard drinks of alcohol     Types: 2 Glasses of wine per week    Drug use: Not Currently     Types: Marijuana     Comment: unsure of last use    Sexual activity: Yes     Partners: Male     Birth control/protection: None   Other Topics Concern    None    Social History Narrative    None     Social Determinants of Health     Financial Resource Strain: Low Risk  (10/18/2023)    Overall Financial Resource Strain (CARDIA)     Difficulty of Paying Living Expenses: Not very hard   Food Insecurity: No Food Insecurity (10/18/2023)    Hunger Vital Sign     Worried About Running Out of Food in the Last Year: Never true     Ran Out of Food in the Last Year: Never true   Transportation Needs: No Transportation Needs (10/18/2023)    PRAPARE - Transportation     Lack of Transportation (Medical): No     Lack of Transportation (Non-Medical): No   Physical Activity: Inactive (5/26/2022)    Exercise Vital Sign     Days of Exercise per Week: 0 days     Minutes of Exercise per Session: 0 min   Stress: No Stress Concern Present (5/26/2022)    [place] [employer] - Occupational Stress Questionnaire     Feeling of Stress : Only a little   Social Connections: Moderately Isolated (5/26/2022)    Social Connection and Isolation Panel [NHANES]     Frequency of Communication with Friends and Family: More than three times a week     Frequency of Social Gatherings with Friends and Family: Once a week     Attends Scientologist Services: Never     Active Member of Clubs or Organizations: No     Attends Club or Organization Meetings: Never     Marital Status: Living with partner   Intimate Partner Violence: Not At Risk (5/26/2022)    Humiliation, Afraid, Rape, and Kick questionnaire     Fear of Current or Ex-Partner: No     Emotionally Abused: No     Physically Abused: No     Sexually Abused: No   Housing Stability: Low Risk  (5/26/2022)    Housing Stability Vital Sign     Unable to Pay for Housing in the Last Year: No     Number of Places Lived in the Last Year: 1     Unstable Housing in the Last Year: No      Family History:     Family History   Problem Relation Age of Onset    Diabetes Mother     Hypertension Mother     Diabetes Father     Hypertension Father     No Known  "Problems Sister     No Known Problems Sister     Asthma Brother     No Known Problems Daughter     Bronchiolitis Daughter     No Known Problems Daughter     No Known Problems Son     No Known Problems Maternal Grandmother     No Known Problems Maternal Grandfather     Glaucoma Paternal Grandmother     Dementia Paternal Grandfather     Breast cancer Neg Hx     Colon cancer Neg Hx     Ovarian cancer Neg Hx       Current Medications:     Current Outpatient Medications   Medication Sig Dispense Refill    hydrOXYzine HCL (ATARAX) 10 mg tablet Take 1 tablet (10 mg total) by mouth every 6 (six) hours as needed for anxiety 30 tablet 0    acetaminophen (TYLENOL) 325 mg tablet Take 2 tablets (650 mg total) by mouth every 4 (four) hours as needed for mild pain or headaches (Patient not taking: Reported on 5/26/2022) 30 tablet 1    ibuprofen (MOTRIN) 600 mg tablet Take 1 tablet (600 mg total) by mouth every 6 (six) hours as needed for mild pain (Patient not taking: Reported on 5/26/2022) 30 tablet 1    miSOPROStol (Cytotec) 200 mcg tablet Take all 4 tablets once (Patient not taking: Reported on 2/6/2024) 4 tablet 1    Multiple Vitamin (MULTIVITAMIN PO) Take by mouth (Patient not taking: Reported on 2/6/2024)      norethindrone (Ortho Micronor) 0.35 MG tablet Take 1 tablet (0.35 mg total) by mouth daily for 28 days 28 tablet 12    Prenatal Vit-Fe Fumarate-FA (PRENATAL VITAMINS PO) Take by mouth (Patient not taking: Reported on 10/25/2023)       No current facility-administered medications for this visit.      Allergies:     No Known Allergies   Physical Exam:     /79 (BP Location: Left arm, Patient Position: Sitting, Cuff Size: Large)   Pulse 77   Temp 97.7 °F (36.5 °C) (Temporal)   Ht 5' 4\" (1.626 m)   Wt 93.4 kg (206 lb)   LMP 08/08/2023 (Exact Date)   SpO2 97%   BMI 35.36 kg/m²     Physical Exam  Constitutional:       General: She is not in acute distress.     Appearance: Normal appearance. She is obese. She is " not ill-appearing, toxic-appearing or diaphoretic.   Neck:      Thyroid: No thyromegaly.   Cardiovascular:      Rate and Rhythm: Normal rate and regular rhythm.      Pulses: Normal pulses.      Heart sounds: Normal heart sounds. No murmur heard.     No gallop.   Pulmonary:      Effort: Pulmonary effort is normal. No respiratory distress.      Breath sounds: Normal breath sounds. No stridor. No wheezing, rhonchi or rales.   Chest:      Chest wall: No tenderness.   Abdominal:      Palpations: Abdomen is soft. There is no mass.      Tenderness: There is no abdominal tenderness.   Musculoskeletal:      Right lower leg: No edema.      Left lower leg: No edema.   Neurological:      Mental Status: She is alert.          Lidia Jeong MD   [facility] INTERNAL MEDICINE

## 2024-02-12 ENCOUNTER — ANNUAL EXAM (OUTPATIENT)
Dept: OBGYN CLINIC | Facility: CLINIC | Age: 34
End: 2024-02-12

## 2024-02-12 ENCOUNTER — APPOINTMENT (OUTPATIENT)
Dept: LAB | Facility: CLINIC | Age: 34
End: 2024-02-12
Payer: COMMERCIAL

## 2024-02-12 VITALS
DIASTOLIC BLOOD PRESSURE: 67 MMHG | WEIGHT: 203.6 LBS | HEIGHT: 64 IN | HEART RATE: 98 BPM | SYSTOLIC BLOOD PRESSURE: 93 MMHG | BODY MASS INDEX: 34.76 KG/M2

## 2024-02-12 DIAGNOSIS — E66.09 CLASS 2 OBESITY DUE TO EXCESS CALORIES WITHOUT SERIOUS COMORBIDITY WITH BODY MASS INDEX (BMI) OF 35.0 TO 35.9 IN ADULT: ICD-10-CM

## 2024-02-12 DIAGNOSIS — Z13.220 SCREENING FOR LIPID DISORDERS: ICD-10-CM

## 2024-02-12 DIAGNOSIS — Z13.6 SCREENING FOR CARDIOVASCULAR CONDITION: ICD-10-CM

## 2024-02-12 DIAGNOSIS — Z13.29 SCREENING FOR THYROID DISORDER: ICD-10-CM

## 2024-02-12 DIAGNOSIS — Z20.2 POSSIBLE EXPOSURE TO STD: ICD-10-CM

## 2024-02-12 DIAGNOSIS — Z01.419 ENCOUNTER FOR GYNECOLOGICAL EXAMINATION WITHOUT ABNORMAL FINDING: Primary | ICD-10-CM

## 2024-02-12 DIAGNOSIS — Z13.21 ENCOUNTER FOR VITAMIN DEFICIENCY SCREENING: ICD-10-CM

## 2024-02-12 DIAGNOSIS — Z00.00 ANNUAL PHYSICAL EXAM: ICD-10-CM

## 2024-02-12 PROBLEM — O03.1 INCOMPLETE ABORTION WITH DELAYED OR EXCESSIVE HEMORRHAGE: Status: RESOLVED | Noted: 2021-10-14 | Resolved: 2024-02-12

## 2024-02-12 PROBLEM — Z30.011 ENCOUNTER FOR INITIAL PRESCRIPTION OF CONTRACEPTIVE PILLS: Status: RESOLVED | Noted: 2022-05-26 | Resolved: 2024-02-12

## 2024-02-12 LAB
25(OH)D3 SERPL-MCNC: 11.9 NG/ML (ref 30–100)
ALBUMIN SERPL BCP-MCNC: 4.1 G/DL (ref 3.5–5)
ALP SERPL-CCNC: 84 U/L (ref 34–104)
ALT SERPL W P-5'-P-CCNC: 9 U/L (ref 7–52)
ANION GAP SERPL CALCULATED.3IONS-SCNC: 7 MMOL/L
AST SERPL W P-5'-P-CCNC: 16 U/L (ref 13–39)
BILIRUB SERPL-MCNC: 0.34 MG/DL (ref 0.2–1)
BUN SERPL-MCNC: 12 MG/DL (ref 5–25)
CALCIUM SERPL-MCNC: 9.3 MG/DL (ref 8.4–10.2)
CHLORIDE SERPL-SCNC: 104 MMOL/L (ref 96–108)
CHOLEST SERPL-MCNC: 131 MG/DL
CO2 SERPL-SCNC: 27 MMOL/L (ref 21–32)
CREAT SERPL-MCNC: 0.89 MG/DL (ref 0.6–1.3)
ERYTHROCYTE [DISTWIDTH] IN BLOOD BY AUTOMATED COUNT: 15.5 % (ref 11.6–15.1)
GFR SERPL CREATININE-BSD FRML MDRD: 86 ML/MIN/1.73SQ M
GLUCOSE P FAST SERPL-MCNC: 83 MG/DL (ref 65–99)
HBV SURFACE AG SER QL: NORMAL
HCT VFR BLD AUTO: 39.7 % (ref 34.8–46.1)
HCV AB SER QL: NORMAL
HDLC SERPL-MCNC: 50 MG/DL
HGB BLD-MCNC: 12.4 G/DL (ref 11.5–15.4)
HIV 1+2 AB+HIV1 P24 AG SERPL QL IA: NORMAL
HIV 2 AB SERPL QL IA: NORMAL
HIV1 AB SERPL QL IA: NORMAL
HIV1 P24 AG SERPL QL IA: NORMAL
LDLC SERPL CALC-MCNC: 71 MG/DL (ref 0–100)
MCH RBC QN AUTO: 27.6 PG (ref 26.8–34.3)
MCHC RBC AUTO-ENTMCNC: 31.2 G/DL (ref 31.4–37.4)
MCV RBC AUTO: 88 FL (ref 82–98)
NONHDLC SERPL-MCNC: 81 MG/DL
PLATELET # BLD AUTO: 273 THOUSANDS/UL (ref 149–390)
PMV BLD AUTO: 10.3 FL (ref 8.9–12.7)
POTASSIUM SERPL-SCNC: 4.2 MMOL/L (ref 3.5–5.3)
PROT SERPL-MCNC: 8.3 G/DL (ref 6.4–8.4)
RBC # BLD AUTO: 4.5 MILLION/UL (ref 3.81–5.12)
SODIUM SERPL-SCNC: 138 MMOL/L (ref 135–147)
TREPONEMA PALLIDUM IGG+IGM AB [PRESENCE] IN SERUM OR PLASMA BY IMMUNOASSAY: NORMAL
TRIGL SERPL-MCNC: 50 MG/DL
TSH SERPL DL<=0.05 MIU/L-ACNC: 1.75 UIU/ML (ref 0.45–4.5)
WBC # BLD AUTO: 5.81 THOUSAND/UL (ref 4.31–10.16)

## 2024-02-12 PROCEDURE — 82306 VITAMIN D 25 HYDROXY: CPT

## 2024-02-12 PROCEDURE — 87591 N.GONORRHOEAE DNA AMP PROB: CPT | Performed by: NURSE PRACTITIONER

## 2024-02-12 PROCEDURE — 80053 COMPREHEN METABOLIC PANEL: CPT

## 2024-02-12 PROCEDURE — 86803 HEPATITIS C AB TEST: CPT

## 2024-02-12 PROCEDURE — 86780 TREPONEMA PALLIDUM: CPT

## 2024-02-12 PROCEDURE — 85027 COMPLETE CBC AUTOMATED: CPT

## 2024-02-12 PROCEDURE — 99395 PREV VISIT EST AGE 18-39: CPT | Performed by: NURSE PRACTITIONER

## 2024-02-12 PROCEDURE — 87491 CHLMYD TRACH DNA AMP PROBE: CPT | Performed by: NURSE PRACTITIONER

## 2024-02-12 PROCEDURE — 36415 COLL VENOUS BLD VENIPUNCTURE: CPT

## 2024-02-12 PROCEDURE — 80061 LIPID PANEL: CPT

## 2024-02-12 PROCEDURE — 84443 ASSAY THYROID STIM HORMONE: CPT

## 2024-02-12 PROCEDURE — 87340 HEPATITIS B SURFACE AG IA: CPT | Performed by: NURSE PRACTITIONER

## 2024-02-12 PROCEDURE — 87389 HIV-1 AG W/HIV-1&-2 AB AG IA: CPT

## 2024-02-12 NOTE — PROGRESS NOTES
ASSESSMENT & PLAN: Ashley Noyola is a 34 y.o.  with normal gynecologic exam.    1.  Routine well woman exam done today  2.  Pap and HPV:  The patient's last pap and hpv was 2022.    It was normal.    Pap and cotesting was not  done today.    Current ASCCP Guidelines reviewed.   3.  The following were reviewed in today's visit: breast self exam, STD testing, family planning choices, adequate intake of calcium and vitamin D, exercise, healthy diet, and tobacco cessation. She desires STD testing which was ordered and included STD blood work  4. Gardisil vaccine in women up to age 45 discussed and information was provided.  Has had    Depression Screening Follow-up Plan: Patient's depression screening was negative with a PHQ-2 score of 0 . Their PHQ-9 score was 3. Clinically patient does not have depression. No treatment is required.    BMI Counseling: Body mass index is 34.95 kg/m². The BMI is above normal. Nutrition recommendations include 3-5 servings of fruits/vegetables daily, moderation in carbohydrate intake, and reducing intake of cholesterol. Exercise recommendations include exercising 3-5 times per week.  Goal weight loss is 15-20 lbs. Will start gym and diet planned.     Tobacco Cessation Counseling: Tobacco cessation counseling was not provided. The patient is sincerely urged to quit consumption of tobacco. She is ready to quit tobacco. The numerous health risks of tobacco consumption were discussed.   She has cut down from 1 pack per day to 1 pack per week, plans to cut further     CC:  Annual Gynecologic Examination    HPI: Ashley Noyola is a 34 y.o.  who presents for annual gynecologic examination.  Has a history of a nonviable pregnancy 10/25/2023 about 8 wks,  she used Cytotec orally.  She reports she had expelled products within 24 hours of taking her medication and had bleeding for about 1 week and then regular menses since December. she has the following concerns:  desires STD  testing    Health Maintenance:    She wears her seatbelt routinely.    She does perform regular monthly self breast exams.    She feels safe at home.     Past Medical History:   Diagnosis Date    Anemia     History of prior pregnancy with SGA  2020    Incomplete  with delayed or excessive hemorrhage 10/14/2021    Migraine     Obesity     Pregnancy with 37 weeks completed gestation 2020     (spontaneous vaginal delivery) 2020    Urinary tract infection     Varicella     had chicken pox       Past Surgical History:   Procedure Laterality Date    DILATION AND EVACUATION N/A 10/14/2021    Procedure: DILATATION AND EVACUATION (D&E) (# OF WEEKS), suction D&C for remaining POC;  Surgeon: Dian Pedro MD;  [facility];  Service: Gynecology    WISDOM TOOTH EXTRACTION         Past OB/Gyn History:  OB History          11    Para   5    Term   5       0    AB   5    Living   5         SAB   1    IAB   4    Ectopic        Multiple   0    Live Births   5           Obstetric Comments     4 Terminations             Pt does not have menstrual issues. Monthly x5 days   History of sexually transmitted infection: No.  History of abnormal pap smears: No .    Patient is currently sexually active.  heterosexual.  The current method of family planning is condoms     Family History   Problem Relation Age of Onset    Diabetes Mother     Hypertension Mother     Diabetes Father     Hypertension Father     No Known Problems Sister     No Known Problems Sister     Asthma Brother     No Known Problems Maternal Grandmother     No Known Problems Maternal Grandfather     Glaucoma Paternal Grandmother     Dementia Paternal Grandfather     No Known Problems Daughter     Bronchiolitis Daughter     No Known Problems Daughter     No Known Problems Son     Breast cancer Neg Hx     Colon cancer Neg Hx     Ovarian cancer Neg Hx        Social History:  Social History     Socioeconomic  History    Marital status: Single     Spouse name: Manav Douglas    Number of children: 4    Years of education: 12    Highest education level: High school graduate   Occupational History    Occupation:    Tobacco Use    Smoking status: Some Days     Types: Cigarettes    Smokeless tobacco: Never   Vaping Use    Vaping status: Never Used   Substance and Sexual Activity    Alcohol use: Yes     Alcohol/week: 2.0 standard drinks of alcohol     Types: 2 Glasses of wine per week    Drug use: Not Currently     Types: Marijuana     Comment: unsure of last use    Sexual activity: Yes     Partners: Male     Birth control/protection: Condom   Other Topics Concern    Not on file   Social History Narrative    Not on file     Social Determinants of Health     Financial Resource Strain: Low Risk  (2/12/2024)    Overall Financial Resource Strain (CARDIA)     Difficulty of Paying Living Expenses: Not hard at all   Food Insecurity: No Food Insecurity (2/12/2024)    Hunger Vital Sign     Worried About Running Out of Food in the Last Year: Never true     Ran Out of Food in the Last Year: Never true   Transportation Needs: No Transportation Needs (2/12/2024)    PRAPARE - Transportation     Lack of Transportation (Medical): No     Lack of Transportation (Non-Medical): No   Physical Activity: Inactive (5/26/2022)    Exercise Vital Sign     Days of Exercise per Week: 0 days     Minutes of Exercise per Session: 0 min   Stress: No Stress Concern Present (5/26/2022)    [employer] [place] [employer] - Occupational Stress Questionnaire     Feeling of Stress : Only a little   Social Connections: Moderately Isolated (5/26/2022)    Social Connection and Isolation Panel [NHANES]     Frequency of Communication with Friends and Family: More than three times a week     Frequency of Social Gatherings with Friends and Family: Once a week     Attends Protestant Services: Never     Active Member of Clubs or Organizations: No      Attends Club or Organization Meetings: Never     Marital Status: Living with partner   Intimate Partner Violence: Not At Risk (2/12/2024)    Humiliation, Afraid, Rape, and Kick questionnaire     Fear of Current or Ex-Partner: No     Emotionally Abused: No     Physically Abused: No     Sexually Abused: No   Housing Stability: Low Risk  (2/12/2024)    Housing Stability Vital Sign     Unable to Pay for Housing in the Last Year: No     Number of Places Lived in the Last Year: 2     Unstable Housing in the Last Year: No     Presently lives with family.  Patient is single.  Patient is currently employed     No Known Allergies      Current Outpatient Medications:     hydrOXYzine HCL (ATARAX) 10 mg tablet, Take 1 tablet (10 mg total) by mouth every 6 (six) hours as needed for anxiety, Disp: 30 tablet, Rfl: 0    Multiple Vitamin (MULTIVITAMIN PO), Take by mouth (Patient not taking: Reported on 2/6/2024), Disp: , Rfl:     norethindrone (Ortho Micronor) 0.35 MG tablet, Take 1 tablet (0.35 mg total) by mouth daily for 28 days, Disp: 28 tablet, Rfl: 12    Prenatal Vit-Fe Fumarate-FA (PRENATAL VITAMINS PO), Take by mouth (Patient not taking: Reported on 10/25/2023), Disp: , Rfl:       Review of Systems  Constitutional :no fever, feels well, no tiredness, no recent weight gain or loss  ENT: no ear ache, no loss of hearing, no nosebleeds or nasal discharge, no sore throat or hoarseness.  Cardiovascular: no complaints of slow or fast heart beat, no chest pain, no palpitations, no leg claudication or lower extremity edema.  Respiratory: no complaints of shortness of shortness of breath, no SCHREIBER  Breasts:no complaints of breast pain, breast lump, or nipple discharge  Gastrointestinal: no complaints of abdominal pain, constipation, nausea, vomiting, or diarrhea or bloody stools  Genitourinary : no complaints of dysuria, incontinence, pelvic pain, no dysmenorrhea, vaginal discharge or abnormal vaginal bleeding and as noted in  "HPI.  Musculoskeletal: no complaints of arthralgia, no myalgia, no joint swelling or stiffness, no limb pain or swelling.  Integumentary: no complaints of skin rash or lesion, itching or dry skin  Neurological: no complaints of headache, no confusion, no numbness or tingling, no dizziness or fainting    Objective      BP 93/67   Pulse 98   Ht 5' 4\" (1.626 m)   Wt 92.4 kg (203 lb 9.6 oz)   LMP 01/23/2024 (Exact Date)   BMI 34.95 kg/m²   General:   appears stated age, cooperative, alert normal mood and affect   Neck: normal, supple,trachea midline, no masses   Heart: regular rate and rhythm, S1, S2 normal, no murmur, click, rub or gallop   Lungs: clear to auscultation bilaterally   Breasts: normal appearance, no masses or tenderness, Inspection negative, No nipple retraction or dimpling, No nipple discharge or bleeding, No axillary or supraclavicular adenopathy, Normal to palpation without dominant masses, Taught monthly breast self examination   Abdomen: soft, non-tender, without masses or organomegaly   Vulva: normal female genitalia, Bartholin's, Urethra, [place] normal   Vagina: normal vagina, no discharge, exudate, lesion, or erythema   Urethra: normal   Cervix: Normal, no discharge. GCC done. Nontender.   Uterus: normal size, contour, position, consistency, mobility, non-tender and anteflexed   Adnexa: normal adnexa and no mass, fullness, tenderness   Lymphatic palpation of lymph nodes in neck, axilla, groin and/or other locations: no lymphadenopathy or masses noted   Skin normal skin turgor and no rashes.   Psychiatric orientation to person, place, and time: normal. mood and affect: normal     "

## 2024-02-13 DIAGNOSIS — E55.9 VITAMIN D DEFICIENCY: Primary | ICD-10-CM

## 2024-02-13 LAB
C TRACH DNA SPEC QL NAA+PROBE: NEGATIVE
N GONORRHOEA DNA SPEC QL NAA+PROBE: NEGATIVE

## 2024-02-13 RX ORDER — ERGOCALCIFEROL 1.25 MG/1
50000 CAPSULE ORAL WEEKLY
Qty: 12 CAPSULE | Refills: 0 | Status: SHIPPED | OUTPATIENT
Start: 2024-02-13 | End: 2024-05-01

## 2024-02-21 PROBLEM — Z01.419 ENCOUNTER FOR GYNECOLOGICAL EXAMINATION WITHOUT ABNORMAL FINDING: Status: RESOLVED | Noted: 2022-05-26 | Resolved: 2024-02-21

## 2024-04-17 ENCOUNTER — TELEPHONE (OUTPATIENT)
Dept: INTERNAL MEDICINE CLINIC | Facility: CLINIC | Age: 34
End: 2024-04-17

## 2024-06-28 DIAGNOSIS — F41.9 ANXIETY: ICD-10-CM

## 2024-06-28 RX ORDER — HYDROXYZINE HYDROCHLORIDE 10 MG/1
10 TABLET, FILM COATED ORAL EVERY 6 HOURS PRN
Qty: 30 TABLET | Refills: 0 | Status: SHIPPED | OUTPATIENT
Start: 2024-06-28

## 2024-07-01 ENCOUNTER — HOSPITAL ENCOUNTER (EMERGENCY)
Facility: HOSPITAL | Age: 34
Discharge: HOME | End: 2024-07-01
Attending: EMERGENCY MEDICINE
Payer: COMMERCIAL

## 2024-07-01 VITALS
HEART RATE: 83 BPM | DIASTOLIC BLOOD PRESSURE: 81 MMHG | RESPIRATION RATE: 16 BRPM | TEMPERATURE: 98.3 F | OXYGEN SATURATION: 99 % | SYSTOLIC BLOOD PRESSURE: 119 MMHG

## 2024-07-01 DIAGNOSIS — K08.89 DENTALGIA: Primary | ICD-10-CM

## 2024-07-01 PROCEDURE — 99282 EMERGENCY DEPT VISIT SF MDM: CPT

## 2024-07-01 PROCEDURE — 99284 EMERGENCY DEPT VISIT MOD MDM: CPT | Performed by: EMERGENCY MEDICINE

## 2024-07-01 RX ORDER — ACETAMINOPHEN 325 MG/1
650 TABLET ORAL ONCE
Status: COMPLETED | OUTPATIENT
Start: 2024-07-01 | End: 2024-07-01

## 2024-07-01 RX ORDER — DIPHENHYDRAMINE HYDROCHLORIDE AND LIDOCAINE HYDROCHLORIDE AND ALUMINUM HYDROXIDE AND MAGNESIUM HYDRO
10 KIT EVERY 4 HOURS PRN
Qty: 237 ML | Refills: 0 | Status: SHIPPED | OUTPATIENT
Start: 2024-07-01

## 2024-07-01 RX ORDER — LIDOCAINE HYDROCHLORIDE 20 MG/ML
15 SOLUTION OROPHARYNGEAL ONCE
Status: COMPLETED | OUTPATIENT
Start: 2024-07-01 | End: 2024-07-01

## 2024-07-01 RX ADMIN — ACETAMINOPHEN 650 MG: 325 TABLET, FILM COATED ORAL at 17:35

## 2024-07-01 RX ADMIN — LIDOCAINE HYDROCHLORIDE 15 ML: 20 SOLUTION ORAL at 17:35

## 2024-07-01 NOTE — Clinical Note
Ashley Noyola was seen and treated in our emergency department on 7/1/2024.    No restrictions            Diagnosis:     Ashley  may return to work on return date.    She may return on this date: 07/03/2024         If you have any questions or concerns, please don't hesitate to call.      Yobany Ramirez MD    ______________________________           _______________          _______________  Hospital Representative                              Date                                Time

## 2024-07-01 NOTE — ED PROVIDER NOTES
History  Chief Complaint   Patient presents with    Dental Pain     Pt c/o R lower dental pain, states she has a damaged tooth. Pt took ibuprofen PTA.      34-year-old female with no significant PMH who presents to the ED for tooth pain on the right lower side.  Patient states she is having pain in her right lower molar and has been unable to get into see the dentist.  She is stating that the pain is now moving into her jaw up into her cheek and ear.  Patient has been taking ibuprofen at home with moderate improvement in her pain.  She states she has been unable to eat on that side due to the pain.  No other acute concerns. Denies chest pain, SOB, cough, abdominal pain, n/v/d, fever, chills, dizziness, lightheadedness, HA, dysuria, hematuria, hematochezia, or melena.           Prior to Admission Medications   Prescriptions Last Dose Informant Patient Reported? Taking?   Multiple Vitamin (MULTIVITAMIN PO)  Self Yes No   Sig: Take by mouth   Patient not taking: Reported on 2024   Prenatal Vit-Fe Fumarate-FA (PRENATAL VITAMINS PO)  Self Yes No   Sig: Take by mouth   Patient not taking: Reported on 10/25/2023   ergocalciferol (VITAMIN D2) 50,000 units   No No   Sig: Take 1 capsule (50,000 Units total) by mouth once a week for 12 doses   hydrOXYzine HCL (ATARAX) 10 mg tablet   No No   Sig: Take 1 tablet (10 mg total) by mouth every 6 (six) hours as needed for anxiety   norethindrone (Ortho Micronor) 0.35 MG tablet   No No   Sig: Take 1 tablet (0.35 mg total) by mouth daily for 28 days      Facility-Administered Medications: None       Past Medical History:   Diagnosis Date    Anemia     History of prior pregnancy with SGA  2020    Incomplete  with delayed or excessive hemorrhage 10/14/2021    Migraine     Obesity     Pregnancy with 37 weeks completed gestation 2020     (spontaneous vaginal delivery) 2020    Urinary tract infection     Varicella     had chicken pox       Past  Surgical History:   Procedure Laterality Date    DILATION AND EVACUATION N/A 10/14/2021    Procedure: DILATATION AND EVACUATION (D&E) (# OF WEEKS), suction D&C for remaining POC;  Surgeon: Dian Pedro MD;  [facility];  Service: Gynecology    WISDOM TOOTH EXTRACTION         Family History   Problem Relation Age of Onset    Diabetes Mother     Hypertension Mother     Diabetes Father     Hypertension Father     No Known Problems Sister     No Known Problems Sister     Asthma Brother     No Known Problems Maternal Grandmother     No Known Problems Maternal Grandfather     Glaucoma Paternal Grandmother     Dementia Paternal Grandfather     No Known Problems Daughter     Bronchiolitis Daughter     No Known Problems Daughter     No Known Problems Son     Breast cancer Neg Hx     Colon cancer Neg Hx     Ovarian cancer Neg Hx      I have reviewed and agree with the history as documented.    E-Cigarette/Vaping    E-Cigarette Use Never User     Start Date 1/1/13      E-Cigarette/Vaping Substances    Nicotine No     THC No     CBD No     Flavoring No     Other No     Unknown No      Social History     Tobacco Use    Smoking status: Some Days     Types: Cigarettes    Smokeless tobacco: Never   Vaping Use    Vaping status: Never Used   Substance Use Topics    Alcohol use: Yes     Alcohol/week: 2.0 standard drinks of alcohol     Types: 2 Glasses of wine per week    Drug use: Not Currently     Types: Marijuana     Comment: unsure of last use        Review of Systems   Constitutional:  Negative for appetite change, chills, diaphoresis, fatigue and fever.   HENT:  Positive for dental problem. Negative for congestion, ear pain, postnasal drip, rhinorrhea, sore throat and trouble swallowing.    Eyes:  Negative for pain and visual disturbance.   Respiratory:  Negative for cough and shortness of breath.    Cardiovascular:  Negative for chest pain and palpitations.   Gastrointestinal:  Negative for abdominal pain,  constipation, diarrhea, nausea and vomiting.   Genitourinary:  Negative for decreased urine volume, dysuria and hematuria.   Musculoskeletal:  Negative for arthralgias and back pain.   Skin:  Negative for color change and rash.   Neurological:  Negative for dizziness, seizures, syncope, weakness, light-headedness and headaches.   All other systems reviewed and are negative.      Physical Exam  ED Triage Vitals [07/01/24 1659]   Temperature Pulse Respirations Blood Pressure SpO2   98.3 °F (36.8 °C) 83 16 119/81 99 %      Temp Source Heart Rate Source Patient Position - Orthostatic VS BP Location FiO2 (%)   Oral Monitor Sitting Left arm --      Pain Score       5             Orthostatic Vital Signs  Vitals:    07/01/24 1659   BP: 119/81   Pulse: 83   Patient Position - Orthostatic VS: Sitting       Physical Exam  Vitals and nursing note reviewed.   Constitutional:       General: She is not in acute distress.     Appearance: Normal appearance. She is normal weight.   HENT:      Head: Normocephalic and atraumatic.      Right Ear: External ear normal.      Left Ear: External ear normal.      Nose: Nose normal.      Mouth/Throat:      Pharynx: Oropharynx is clear.      Comments: Dental carry noted of the right lower molar with minimal surrounding erythema and edema.  No noted purulent or bloody drainage.  No fluctuant mass noted.  Eyes:      Conjunctiva/sclera: Conjunctivae normal.   Cardiovascular:      Rate and Rhythm: Normal rate and regular rhythm.      Pulses: Normal pulses.      Heart sounds: Normal heart sounds.      Comments: RRR with +S1 and S2, no murmurs appreciated on exam. Peripheral pulses intact.    Pulmonary:      Effort: Pulmonary effort is normal. No respiratory distress.      Breath sounds: Normal breath sounds. No wheezing, rhonchi or rales.      Comments: CTABL with no abnormal lung sounds such as wheezes or rales appreciated on exam.     Abdominal:      General: Abdomen is flat. Bowel sounds are  normal. There is no distension.      Palpations: Abdomen is soft.      Tenderness: There is no abdominal tenderness.      Comments: Soft, non tender, normo-active bowel sounds. Without rigidity, guarding, or distension.     Musculoskeletal:         General: Normal range of motion.      Cervical back: Normal range of motion.      Right lower leg: No edema.      Left lower leg: No edema.   Skin:     General: Skin is warm and dry.   Neurological:      General: No focal deficit present.      Mental Status: She is alert and oriented to person, place, and time. Mental status is at baseline.      Comments: CN grossly intact on visualization. No focal neurologic deficits noted on exam.  5/5 strength in all extremities. Neurovascularly intact with normal sensation and motor function.             ED Medications  Medications   acetaminophen (TYLENOL) tablet 650 mg (650 mg Oral Given 7/1/24 1735)   Lidocaine Viscous HCl (XYLOCAINE) 2 % mucosal solution 15 mL (15 mL Swish & Spit Given 7/1/24 1735)       Diagnostic Studies  Results Reviewed       None                   No orders to display         Procedures  Procedures      ED Course                                       Medical Decision Making  34-year-old female with no significant PMH who presents to the ED for tooth pain in the right lower side.  Upon examination at bedside, patient was noted to have a dental carry noted on the right lower molar with minimal surrounding erythema or edema.  No noted fluctuant mass or purulent drainage.  While in the emergency department, patient was given 15 mL of swish and spit viscous lidocaine along with 650 mg of Tylenol for pain control.  Patient was reassured of her symptoms and explained that she needs to follow-up outpatient with a dentist.  Through shared decision making to the patient and the provider, the patient was planned for discharge.  Patient was advised to follow-up outpatient with her PCP as well as schedule appointment  with a dentist.  Patient was also provided a prescription for Magic mouthwash to use for the pain.  She was also instructed return to the ED if her symptoms worsen including but not limited to fever, chills, nausea or vomiting, increasing severity of pain, purulent drainage, lightheadedness or dizziness, changes in her behavior.    Risk  OTC drugs.  Prescription drug management.          Disposition  Final diagnoses:   Dentalgia     Time reflects when diagnosis was documented in both MDM as applicable and the Disposition within this note       Time User Action Codes Description Comment    7/1/2024  5:15 PM Yobany Ramirez Add [K08.89] Dentalgia           ED Disposition       ED Disposition   Discharge    Condition   Stable    Date/Time   Mon Jul 1, 2024  5:29 PM    Comment   Ashley Noyola discharge to home/self care.                   Follow-up Information       Follow up With Specialties Details Why Contact Info Additional Information    Lidia Jeong MD Internal Medicine Call  As needed [address] [place] [address]   [address] [place] [id number] [phone]       [facility] Emergency Department Emergency Medicine Go to  If symptoms worsen [address] [id number]  [phone] [facility] Emergency Department, [address] [id number]            Discharge Medication List as of 7/1/2024  5:34 PM        START taking these medications    Details   diphenhydramine, lidocaine, Al/Mg hydroxide, simethicone (Magic Mouthwash) SUSP Swish and spit 10 mL every 4 (four) hours as needed for mouth pain or discomfort, Starting Mon 7/1/2024, Normal           CONTINUE these medications which have NOT CHANGED    Details   ergocalciferol (VITAMIN D2) 50,000 units Take 1 capsule (50,000 Units total) by mouth once a week for 12 doses, Starting Tue 2/13/2024, Until Wed 5/1/2024, Normal      hydrOXYzine HCL (ATARAX) 10 mg tablet Take 1 tablet (10 mg total) by mouth  every 6 (six) hours as needed for anxiety, Starting Fri 6/28/2024, Normal      Multiple Vitamin (MULTIVITAMIN PO) Take by mouth, Historical Med      norethindrone (Ortho Micronor) 0.35 MG tablet Take 1 tablet (0.35 mg total) by mouth daily for 28 days, Starting Thu 5/26/2022, Until Thu 6/23/2022, Normal      Prenatal Vit-Fe Fumarate-FA (PRENATAL VITAMINS PO) Take by mouth, Historical Med           No discharge procedures on file.    PDMP Review       None             ED Provider  Attending physically available and evaluated Ashley Noyola. I managed the patient along with the ED Attending.    Electronically Signed by           Yobany Ramirez MD  07/02/24 6083

## 2024-10-31 ENCOUNTER — ULTRASOUND (OUTPATIENT)
Dept: OBGYN CLINIC | Facility: CLINIC | Age: 34
End: 2024-10-31
Payer: COMMERCIAL

## 2024-10-31 VITALS
SYSTOLIC BLOOD PRESSURE: 116 MMHG | BODY MASS INDEX: 38.07 KG/M2 | DIASTOLIC BLOOD PRESSURE: 74 MMHG | HEIGHT: 64 IN | WEIGHT: 223 LBS

## 2024-10-31 DIAGNOSIS — Z72.0 TOBACCO USE: Primary | ICD-10-CM

## 2024-10-31 DIAGNOSIS — Z3A.01 LESS THAN 8 WEEKS GESTATION OF PREGNANCY: ICD-10-CM

## 2024-10-31 PROBLEM — N93.9 VAGINAL HEMORRHAGE: Status: RESOLVED | Noted: 2021-10-13 | Resolved: 2024-10-31

## 2024-10-31 PROBLEM — Z20.2 POSSIBLE EXPOSURE TO STD: Status: RESOLVED | Noted: 2024-02-12 | Resolved: 2024-10-31

## 2024-10-31 PROCEDURE — 99203 OFFICE O/P NEW LOW 30 MIN: CPT | Performed by: STUDENT IN AN ORGANIZED HEALTH CARE EDUCATION/TRAINING PROGRAM

## 2024-10-31 PROCEDURE — 76817 TRANSVAGINAL US OBSTETRIC: CPT | Performed by: STUDENT IN AN ORGANIZED HEALTH CARE EDUCATION/TRAINING PROGRAM

## 2024-10-31 NOTE — PROGRESS NOTES
S: 34 y.o.   who presents for viability scan with LMP of 24- she is uncertain of this exact date. She is 7 weeks and 3 days by her LMP. She reports fatigue.She had normal CBC and TSH in 2024. She has had cramping or vaginal bleeding- 2 days ago, mild. This is not a planned but is a welcomed pregnancy. Her previous pregnancy was complicated by miscarriage- medical management at 8 weeks.     Denies vaginal irritation, discharge, dysuria.     She is taking a multivitamin- uncertain if it has correct amount of iron, folate, etc. She will switch to a prenatal.     Past Medical History:   Diagnosis Date    Anemia     History of prior pregnancy with SGA  2020    Incomplete  with delayed or excessive hemorrhage 10/14/2021    Migraine     Obesity     Possible exposure to STD 2024    Pregnancy with 37 weeks completed gestation 2020     (spontaneous vaginal delivery) 2020    Tobacco use 2024    Quit 2 months ago      Urinary tract infection     Vaginal hemorrhage 10/13/2021    Varicella     had chicken pox       OB History    Para Term  AB Living   12 5 5 0 5 5   SAB IAB Ectopic Multiple Live Births   1 4   0 5      # Outcome Date GA Lbr Shay/2nd Weight Sex Type Anes PTL Lv   12 Current            11 SAB 10/14/21     SAB      10 Term 20 38w4d  2915 g (6 lb 6.8 oz) F Vag-Spont None  TAYLOR   9 IAB  6w0d          8 IAB 2018 6w0d          7 IAB 2018 6w0d          6 Term 17 40w0d  3629 g (8 lb) F Vag-Spont None N TAYLOR   5 Term 16 40w0d  3175 g (7 lb) M Vag-Spont EPI N TAYLOR   4 Term 10/27/14 40w0d  2722 g (6 lb) F Vag-Spont EPI N TAYLOR      Birth Comments: induced  due to ovarian cyst   3 Term 12 40w0d  2268 g (5 lb) F Vag-Spont EPI Y TAYLOR   2 IAB  20w0d          1                Obstetric Comments      4 Terminations        O:  Vitals:    10/31/24 1101   BP: 116/74       TVUS: viable, elizabeth IUP at 6 weeks 6 days with  CRL .9cm. . BRITTNY 6/20/2025. Final dating via 1st trimester US given uncertain LMP.      A/P:  #1. IUP at 6 weeks and 6 days  - Viable pregnancy on TVUS  - RTC in 1-2 weeks for nurse intake visit  - MFM referral placed for cfDNA and NT      Problem List       Migraines    Class 2 obesity due to excess calories without serious comorbidity with body mass index (BMI) of 35.0 to 35.9 in adult    Family history of early CAD    Anxiety     Other Visit Diagnoses       Less than 8 weeks gestation of pregnancy                   Susie Knapp MD    OB/GYN  10/31/2024  12:01 PM

## 2024-11-01 ENCOUNTER — TELEPHONE (OUTPATIENT)
Age: 34
End: 2024-11-01

## 2024-11-07 ENCOUNTER — TELEPHONE (OUTPATIENT)
Dept: BARIATRICS | Facility: CLINIC | Age: 34
End: 2024-11-07

## 2024-11-14 ENCOUNTER — INITIAL PRENATAL (OUTPATIENT)
Dept: OBGYN CLINIC | Facility: CLINIC | Age: 34
End: 2024-11-14
Payer: COMMERCIAL

## 2024-11-14 VITALS
HEIGHT: 64 IN | SYSTOLIC BLOOD PRESSURE: 124 MMHG | DIASTOLIC BLOOD PRESSURE: 68 MMHG | BODY MASS INDEX: 39.2 KG/M2 | WEIGHT: 229.6 LBS

## 2024-11-14 DIAGNOSIS — Z31.430 ENCOUNTER OF FEMALE FOR TESTING FOR GENETIC DISEASE CARRIER STATUS FOR PROCREATIVE MANAGEMENT: ICD-10-CM

## 2024-11-14 DIAGNOSIS — Z34.81 PRENATAL CARE, SUBSEQUENT PREGNANCY, FIRST TRIMESTER: Primary | ICD-10-CM

## 2024-11-14 DIAGNOSIS — O99.211 OBESITY AFFECTING PREGNANCY IN FIRST TRIMESTER, UNSPECIFIED OBESITY TYPE: ICD-10-CM

## 2024-11-14 PROCEDURE — 99211 OFF/OP EST MAY X REQ PHY/QHP: CPT | Performed by: STUDENT IN AN ORGANIZED HEALTH CARE EDUCATION/TRAINING PROGRAM

## 2024-11-14 RX ORDER — ASPIRIN 81 MG/1
162 TABLET, CHEWABLE ORAL DAILY
Qty: 180 TABLET | Refills: 1 | Status: SHIPPED | OUTPATIENT
Start: 2024-12-06

## 2024-11-14 NOTE — PATIENT INSTRUCTIONS
Congratulations!! Please review our Pregnancy Essential Guide and [facility] L&D Virtual tour from our networks website.     [facility] Pregnancy Essentials Guide  [facility] ([email or web address])     [facility] ([employer])

## 2024-11-14 NOTE — PROGRESS NOTES
Details that I feel the provider should be aware of:   - hx term uncomplicated  x5  - hx IAB x4  - hx SAB x2,  w/hemorrhage and requiring D&E,  naturally  - last pap 22 NILM w/neg HPV, denies hx abn pap  - hx PPD 2016  - ASA therapy indicated and reviewed w/pt. Pt requested prescription, script sent to provider for pt to start @ 12wks  - early 1 hr glucose indicated and ordered  - pt states she desires this to be her last pregnancy    OB INTAKE INTERVIEW  Patient is 34 y.o. who presents for OB intake at 8w6d  She is present alone during this encounter  The father of her baby (Mary Anne) is involved in the pregnancy and is 34 years old.      Last Menstrual Period: 24 approx  Ultrasound: Measured 6 weeks 6 days on 10/31/24  Estimated Date of Delivery: 25  changed by 6 week US    Signs/Symptoms of Pregnancy  Current pregnancy symptoms: fatigue  Constipation no  Headaches YES - infrequent once or twice a week. Sleep for relief. Reviewed tylenol recommendations in pregnancy  Cramping/spotting no  PICA cravings no    Diabetes-  BMI 38.28  If patient has 1 or more, please order early 1 hour GTT  History of GDM no  BMI >35 YES  History of PCOS or current metformin use no  History of LGA/macrosomic infant (4000g/9lbs) no    If patient has 2 or more, please order early 1 hour GTT  BMI>30 YES  AMA no  First degree relative with type 2 diabetes YES  History of chronic HTN, hyperlipidemia, elevated A1C no  High risk race (, , ,  or ) YES    Hypertension- if you answer yes to any of the following, please order baseline preeclampsia labs (cbc, comprehensive metabolic panel, urine protein creatinine ratio, uric acid)  History of of chronic HTN no  History of gestational HTN no  History of preeclampsia, eclampsia, or HELLP syndrome no  History of diabetes no  History of lupus,sjogrens syndrome, kidney disease no    Thyroid- if yes order  TSH with reflex T4  History of thyroid disease no    Bleeding Disorder or Hx of DVT-patient or first degree relative with history of. Order the following if not done previously.   (Factor V, antithrombin III, prothrombin gene mutation, protein C and S Ag, lupus anticoagulant, anticardiolipin, beta-2 glycoprotein)   no    OB/GYN-  History of abnormal pap smear no       Date of last pap smear 22 NILM w/neg HPV  History of HPV no  History of Herpes/HSV no  History of other STI (gonorrhea, chlamydia, trich) no  History of prior  YESx5  History of prior  no  History of  delivery prior to 36 weeks 6 days no  History of Varicella or Vaccination hx chicken pox  History of blood transfusion YES w/SAB   Ok for blood transfusion yes    Substance screening-   History of tobacco use YES - quit 2023  Currently using tobacco no  Substance Use Screen Level (N/A, LOW, HIGH) n/a    MRSA Screening-   Does the pt have a hx of MRSA? no    Immunizations:  Influenza vaccine given this season reviewed   Discussed Tdap vaccine yes  Discussed COVID Vaccine yes    Genetic/[facility]-  Do you or your partner have a history of any of the following in yourselves or first degree relatives?  Cystic fibrosis no  Spinal muscular atrophy no  Hemoglobinopathy/Sickle Cell/Thalassemia no  Fragile X Intellectual Disability no    CF completed 2017  SMA reviewed, ordered and pt aware of prior auth/scheduling process.    Appointment for Nuchal Translucency Ultrasound at [facility] scheduled for 24      Interview education  [facility] Pregnancy Essentials Book reviewed, discussed and attached to their AVS yes    Nurse/Family Partnership- patient may qualify no; referral placed no    Prenatal lab work scripts yes  Extra labs ordered:  1hr glucose, SMA, hgb fractionation cascade    Aspirin/Preeclampsia Screen    Risk Level Risk Factor Recommendation   LOW Prior Uncomplicated full-term delivery YES No Aspirin recommendation         MODERATE Nulliparity no Recommend low-dose aspirin if     BMI>30 YES 2 or more moderate risk factors    Family History Preeclampsia (mother/sister) YES - pt's sister in one pregnancy     35yr old or greater no     Black Race, Concern for SDOH/Low Socioeconomic YES     IVF Pregnancy  no     Personal History Risks (low birth weight, prior adverse preg outcome, >10yr preg interval) YES - hx sab x2         HIGH History of Preeclampsia no Recommend low-dose aspirin if     Multifetal gestation no 1 or more high risk factors    Chronic HTN no     Type 1 or 2 Diabetes no     Renal Disease no     Autoimmune Disease  no      Contraindications to ASA therapy:  NSAID/ ASA allergy: no  Nasal polyps: no  Asthma with history of ASA induced bronchospasm: no  Relative contraindications:  History of GI bleed: no  Active peptic ulcer disease: no  Severe hepatic dysfunction: no    Patient should be recommended to take ASA 162mg during this pregnancy from 12-36wks to lower her risk of preeclampsia: reviewed risk factors and ASA therapy. Pt verbalized understanding and had no questions at this time. Pt requested prescription be sent to pharmacy, script sent to provider to sign and for pt to start @ 12 weeks of pregnancy.          The patient has a history now or in prior pregnancy notable for:  Hx term SVDx5, IABx4, SABx2. Hx PPD 2016. Elevated BMI. Risk factors for preeclampsia and recommended ASA therapy starting at 12 weeks.         PN1 visit scheduled. The patient was oriented to our practice, the navigator role, reviewed delivering physicians and [facility] for Delivery. All questions were answered.    Interviewed by: MASSIMO Blair RN

## 2024-11-18 PROBLEM — O09.299 H/O POSTPARTUM HEMORRHAGE, CURRENTLY PREGNANT: Status: ACTIVE | Noted: 2024-11-18

## 2024-11-20 ENCOUNTER — TELEPHONE (OUTPATIENT)
Dept: OBGYN CLINIC | Facility: CLINIC | Age: 34
End: 2024-11-20

## 2024-11-20 NOTE — TELEPHONE ENCOUNTER
Left message patient's VM re: prenatal vitamins & to add dha supplement 300 mg/day if not in prenatal vitamin.  Recommend to recall office if any questions.

## 2024-11-22 ENCOUNTER — APPOINTMENT (OUTPATIENT)
Dept: LAB | Facility: CLINIC | Age: 34
End: 2024-11-22
Payer: COMMERCIAL

## 2024-11-22 DIAGNOSIS — Z34.81 PRENATAL CARE, SUBSEQUENT PREGNANCY, FIRST TRIMESTER: Primary | ICD-10-CM

## 2024-11-22 LAB
ABO GROUP BLD: NORMAL
BACTERIA UR QL AUTO: ABNORMAL /HPF
BASOPHILS # BLD AUTO: 0.02 THOUSANDS/ÂΜL (ref 0–0.1)
BASOPHILS NFR BLD AUTO: 0 % (ref 0–1)
BILIRUB UR QL STRIP: NEGATIVE
BLD GP AB SCN SERPL QL: POSITIVE
BLOOD GROUP ANTIBODIES SERPL: NORMAL
CLARITY UR: CLEAR
COLOR UR: ABNORMAL
EOSINOPHIL # BLD AUTO: 0.07 THOUSAND/ÂΜL (ref 0–0.61)
EOSINOPHIL NFR BLD AUTO: 1 % (ref 0–6)
ERYTHROCYTE [DISTWIDTH] IN BLOOD BY AUTOMATED COUNT: 16 % (ref 11.6–15.1)
GLUCOSE UR STRIP-MCNC: NEGATIVE MG/DL
HBV SURFACE AG SER QL: NORMAL
HCT VFR BLD AUTO: 35.3 % (ref 34.8–46.1)
HCV AB SER QL: NORMAL
HGB BLD-MCNC: 11.5 G/DL (ref 11.5–15.4)
HGB UR QL STRIP.AUTO: NEGATIVE
IMM GRANULOCYTES # BLD AUTO: 0.03 THOUSAND/UL (ref 0–0.2)
IMM GRANULOCYTES NFR BLD AUTO: 0 % (ref 0–2)
KELL GROUP AG RBC: NEGATIVE
KETONES UR STRIP-MCNC: NEGATIVE MG/DL
LEUKOCYTE ESTERASE UR QL STRIP: NEGATIVE
LYMPHOCYTES # BLD AUTO: 2.82 THOUSANDS/ÂΜL (ref 0.6–4.47)
LYMPHOCYTES NFR BLD AUTO: 41 % (ref 14–44)
MCH RBC QN AUTO: 29.1 PG (ref 26.8–34.3)
MCHC RBC AUTO-ENTMCNC: 32.6 G/DL (ref 31.4–37.4)
MCV RBC AUTO: 89 FL (ref 82–98)
MONOCYTES # BLD AUTO: 0.41 THOUSAND/ÂΜL (ref 0.17–1.22)
MONOCYTES NFR BLD AUTO: 6 % (ref 4–12)
MUCOUS THREADS UR QL AUTO: ABNORMAL
NEUTROPHILS # BLD AUTO: 3.59 THOUSANDS/ÂΜL (ref 1.85–7.62)
NEUTS SEG NFR BLD AUTO: 52 % (ref 43–75)
NITRITE UR QL STRIP: NEGATIVE
NON-SQ EPI CELLS URNS QL MICRO: ABNORMAL /HPF
NRBC BLD AUTO-RTO: 0 /100 WBCS
PH UR STRIP.AUTO: 7 [PH]
PLATELET # BLD AUTO: 266 THOUSANDS/UL (ref 149–390)
PMV BLD AUTO: 10.4 FL (ref 8.9–12.7)
PROT UR STRIP-MCNC: ABNORMAL MG/DL
RBC # BLD AUTO: 3.95 MILLION/UL (ref 3.81–5.12)
RBC #/AREA URNS AUTO: ABNORMAL /HPF
RH BLD: POSITIVE
RUBV IGG SERPL IA-ACNC: 15 IU/ML
SP GR UR STRIP.AUTO: 1.03 (ref 1–1.03)
SPECIMEN EXPIRATION DATE: NORMAL
UROBILINOGEN UR STRIP-ACNC: <2 MG/DL
WBC # BLD AUTO: 6.94 THOUSAND/UL (ref 4.31–10.16)
WBC #/AREA URNS AUTO: ABNORMAL /HPF

## 2024-11-22 PROCEDURE — 86901 BLOOD TYPING SEROLOGIC RH(D): CPT

## 2024-11-22 PROCEDURE — 83020 HEMOGLOBIN ELECTROPHORESIS: CPT

## 2024-11-22 PROCEDURE — 86870 RBC ANTIBODY IDENTIFICATION: CPT

## 2024-11-22 PROCEDURE — 36415 COLL VENOUS BLD VENIPUNCTURE: CPT

## 2024-11-22 PROCEDURE — 87086 URINE CULTURE/COLONY COUNT: CPT

## 2024-11-22 PROCEDURE — 86905 BLOOD TYPING RBC ANTIGENS: CPT

## 2024-11-22 PROCEDURE — 87340 HEPATITIS B SURFACE AG IA: CPT

## 2024-11-22 PROCEDURE — 81001 URINALYSIS AUTO W/SCOPE: CPT

## 2024-11-22 PROCEDURE — 85025 COMPLETE CBC W/AUTO DIFF WBC: CPT

## 2024-11-22 PROCEDURE — 86762 RUBELLA ANTIBODY: CPT

## 2024-11-22 PROCEDURE — 86780 TREPONEMA PALLIDUM: CPT

## 2024-11-22 PROCEDURE — 86803 HEPATITIS C AB TEST: CPT

## 2024-11-22 PROCEDURE — 86850 RBC ANTIBODY SCREEN: CPT

## 2024-11-22 PROCEDURE — 87389 HIV-1 AG W/HIV-1&-2 AB AG IA: CPT

## 2024-11-22 PROCEDURE — 86900 BLOOD TYPING SEROLOGIC ABO: CPT

## 2024-11-22 PROCEDURE — 86886 COOMBS TEST INDIRECT TITER: CPT

## 2024-11-23 LAB
A AB TITR SERPL: NORMAL {TITER}
BACTERIA UR CULT: NORMAL
HIV 1+2 AB+HIV1 P24 AG SERPL QL IA: NORMAL
HIV 2 AB SERPL QL IA: NORMAL
HIV1 AB SERPL QL IA: NORMAL
HIV1 P24 AG SERPL QL IA: NORMAL
TREPONEMA PALLIDUM IGG+IGM AB [PRESENCE] IN SERUM OR PLASMA BY IMMUNOASSAY: NORMAL

## 2024-11-24 ENCOUNTER — HOSPITAL ENCOUNTER (EMERGENCY)
Facility: HOSPITAL | Age: 34
Discharge: HOME | End: 2024-11-25
Attending: EMERGENCY MEDICINE
Payer: COMMERCIAL

## 2024-11-24 DIAGNOSIS — O20.0 THREATENED MISCARRIAGE: Primary | ICD-10-CM

## 2024-11-24 DIAGNOSIS — O46.90 VAGINAL BLEEDING DURING PREGNANCY: ICD-10-CM

## 2024-11-24 DIAGNOSIS — R82.81 PYURIA: ICD-10-CM

## 2024-11-24 LAB
ALBUMIN SERPL BCG-MCNC: 3.8 G/DL (ref 3.5–5)
ALP SERPL-CCNC: 58 U/L (ref 34–104)
ALT SERPL W P-5'-P-CCNC: 14 U/L (ref 7–52)
ANION GAP SERPL CALCULATED.3IONS-SCNC: 6 MMOL/L (ref 4–13)
AST SERPL W P-5'-P-CCNC: 33 U/L (ref 13–39)
B-HCG SERPL-ACNC: ABNORMAL MIU/ML (ref 0–5)
BASOPHILS # BLD AUTO: 0.02 THOUSANDS/ΜL (ref 0–0.1)
BASOPHILS NFR BLD AUTO: 0 % (ref 0–1)
BILIRUB SERPL-MCNC: 0.28 MG/DL (ref 0.2–1)
BILIRUB UR QL STRIP: NEGATIVE
BUN SERPL-MCNC: 11 MG/DL (ref 5–25)
CALCIUM SERPL-MCNC: 8.9 MG/DL (ref 8.4–10.2)
CHLORIDE SERPL-SCNC: 104 MMOL/L (ref 96–108)
CLARITY UR: ABNORMAL
CO2 SERPL-SCNC: 22 MMOL/L (ref 21–32)
COLOR UR: ABNORMAL
CREAT SERPL-MCNC: 0.7 MG/DL (ref 0.6–1.3)
EOSINOPHIL # BLD AUTO: 0.07 THOUSAND/ΜL (ref 0–0.61)
EOSINOPHIL NFR BLD AUTO: 1 % (ref 0–6)
ERYTHROCYTE [DISTWIDTH] IN BLOOD BY AUTOMATED COUNT: 15.9 % (ref 11.6–15.1)
EXT PREGNANCY TEST URINE: POSITIVE
EXT. CONTROL: ABNORMAL
GFR SERPL CREATININE-BSD FRML MDRD: 115 ML/MIN/1.73SQ M
GLUCOSE SERPL-MCNC: 106 MG/DL (ref 65–140)
GLUCOSE UR STRIP-MCNC: NEGATIVE MG/DL
HCT VFR BLD AUTO: 35.3 % (ref 34.8–46.1)
HGB BLD-MCNC: 11.5 G/DL (ref 11.5–15.4)
HGB UR QL STRIP.AUTO: ABNORMAL
IMM GRANULOCYTES # BLD AUTO: 0.02 THOUSAND/UL (ref 0–0.2)
IMM GRANULOCYTES NFR BLD AUTO: 0 % (ref 0–2)
KETONES UR STRIP-MCNC: NEGATIVE MG/DL
LEUKOCYTE ESTERASE UR QL STRIP: ABNORMAL
LYMPHOCYTES # BLD AUTO: 3.17 THOUSANDS/ΜL (ref 0.6–4.47)
LYMPHOCYTES NFR BLD AUTO: 38 % (ref 14–44)
MCH RBC QN AUTO: 29.3 PG (ref 26.8–34.3)
MCHC RBC AUTO-ENTMCNC: 32.6 G/DL (ref 31.4–37.4)
MCV RBC AUTO: 90 FL (ref 82–98)
MONOCYTES # BLD AUTO: 0.66 THOUSAND/ΜL (ref 0.17–1.22)
MONOCYTES NFR BLD AUTO: 8 % (ref 4–12)
NEUTROPHILS # BLD AUTO: 4.34 THOUSANDS/ΜL (ref 1.85–7.62)
NEUTS SEG NFR BLD AUTO: 53 % (ref 43–75)
NITRITE UR QL STRIP: NEGATIVE
NRBC BLD AUTO-RTO: 0 /100 WBCS
PH UR STRIP.AUTO: 6.5 [PH]
PLATELET # BLD AUTO: 256 THOUSANDS/UL (ref 149–390)
PMV BLD AUTO: 9.9 FL (ref 8.9–12.7)
POTASSIUM SERPL-SCNC: 5.2 MMOL/L (ref 3.5–5.3)
PROT SERPL-MCNC: 7.7 G/DL (ref 6.4–8.4)
PROT UR STRIP-MCNC: ABNORMAL MG/DL
RBC # BLD AUTO: 3.92 MILLION/UL (ref 3.81–5.12)
SODIUM SERPL-SCNC: 132 MMOL/L (ref 135–147)
SP GR UR STRIP.AUTO: 1.03 (ref 1–1.03)
UROBILINOGEN UR STRIP-ACNC: 2 MG/DL
WBC # BLD AUTO: 8.28 THOUSAND/UL (ref 4.31–10.16)

## 2024-11-24 PROCEDURE — 36415 COLL VENOUS BLD VENIPUNCTURE: CPT

## 2024-11-24 PROCEDURE — 99285 EMERGENCY DEPT VISIT HI MDM: CPT | Performed by: EMERGENCY MEDICINE

## 2024-11-24 PROCEDURE — 99284 EMERGENCY DEPT VISIT MOD MDM: CPT

## 2024-11-24 PROCEDURE — 87086 URINE CULTURE/COLONY COUNT: CPT | Performed by: EMERGENCY MEDICINE

## 2024-11-24 PROCEDURE — 80053 COMPREHEN METABOLIC PANEL: CPT | Performed by: EMERGENCY MEDICINE

## 2024-11-24 PROCEDURE — 84702 CHORIONIC GONADOTROPIN TEST: CPT

## 2024-11-24 PROCEDURE — 81025 URINE PREGNANCY TEST: CPT | Performed by: EMERGENCY MEDICINE

## 2024-11-24 PROCEDURE — 81001 URINALYSIS AUTO W/SCOPE: CPT | Performed by: EMERGENCY MEDICINE

## 2024-11-24 PROCEDURE — 85025 COMPLETE CBC W/AUTO DIFF WBC: CPT | Performed by: EMERGENCY MEDICINE

## 2024-11-24 NOTE — Clinical Note
Ashley Noyola was seen and treated in our emergency department on 11/24/2024.                Diagnosis: Pregnancy Issues    Ashley  may return to work on return date.    She may return on this date: 11/27/2024         If you have any questions or concerns, please don't hesitate to call.      Joni Currie, DO    ______________________________           _______________          _______________  Hospital Representative                              Date                                Time

## 2024-11-25 ENCOUNTER — NURSE TRIAGE (OUTPATIENT)
Age: 34
End: 2024-11-25

## 2024-11-25 VITALS
HEART RATE: 91 BPM | TEMPERATURE: 98.4 F | OXYGEN SATURATION: 98 % | DIASTOLIC BLOOD PRESSURE: 77 MMHG | SYSTOLIC BLOOD PRESSURE: 132 MMHG | RESPIRATION RATE: 16 BRPM

## 2024-11-25 LAB
BACTERIA UR QL AUTO: ABNORMAL /HPF
HGB A MFR BLD: 2.3 % (ref 1.8–3.2)
HGB A MFR BLD: 97.7 % (ref 96.4–98.8)
HGB F MFR BLD: 0 % (ref 0–2)
HGB FRACT BLD-IMP: NORMAL
HGB S MFR BLD: 0 %
NON-SQ EPI CELLS URNS QL MICRO: ABNORMAL /HPF
RBC #/AREA URNS AUTO: ABNORMAL /HPF
WBC #/AREA URNS AUTO: ABNORMAL /HPF

## 2024-11-25 RX ORDER — CEPHALEXIN 500 MG/1
500 CAPSULE ORAL EVERY 12 HOURS SCHEDULED
Qty: 10 CAPSULE | Refills: 0 | Status: SHIPPED | OUTPATIENT
Start: 2024-11-25 | End: 2024-11-30

## 2024-11-25 NOTE — ED ATTENDING ATTESTATION
11/24/2024  I, Aranza Gallo MD, saw and evaluated the patient. I have discussed the patient with the resident/non-physician practitioner and agree with the resident's/non-physician practitioner's findings, Plan of Care, and MDM as documented in the resident's/non-physician practitioner's note, except where noted. All available labs and Radiology studies were reviewed.  I was present for key portions of any procedure(s) performed by the resident/non-physician practitioner and I was immediately available to provide assistance.       At this point I agree with the current assessment done in the Emergency Department.  I have conducted an independent evaluation of this patient a history and physical is as follows:    34-year-old female about 10 weeks pregnant presenting to the ER with vaginal bleeding.  No pain.  No fevers or chills.  No cramping.  No urinary symptoms.  Rh+, does not need RhoGAM.  No systemic signs or symptoms.    Bedside ultrasound does intrauterine pregnancy with fetal heart rate.    Agree with blood work ordered by resident, as long as stable outpatient follow-up      ED Course         Critical Care Time  Procedures

## 2024-11-25 NOTE — TELEPHONE ENCOUNTER
"OB patient 10w3d  with vagina bleeding. Bleeding started yesterday  - was having moderate vaginal bleeding and small clots. Patient seen in ED - bedside ultrasound performed that showed FHR of 173. Unable to determine where bleeding was originating from. Told to follow-up with OBGYN. Today bleeding is much lighter than yesterday, still having red bleeding when wiping but not accumulating on pad. Denies abdominal pain. Next OV is scheduled for .     ESC sent to Dr. Neal - Raul! She can continue to monitor- hopefully her bleeding will resolve. If she has heavy bleeding where she is soaking through more than a pad an hour for two hours or symptoms of anemia, or pain that is unable to be controlled at home, then she should return to the emergency department. What is her Rh status?     Patient is O+     Outgoing call to patient - informed of recommendations. Patient verbalized understanding.     Answer Assessment - Initial Assessment Questions  1. ONSET: \"When did this bleeding start?\"          2. BLEEDING SEVERITY: \"Describe the bleeding that you are having.\" \"How much bleeding is there?\"       Was moderate, is now lighter - spotting  3. ABDOMEN PAIN: \"Do you have any pain?\" \"How bad is the pain?\"  (e.g., Scale 0-10; none, mild, moderate, or severe)      denies  4. PREGNANCY: \"Do you know how many weeks or months pregnant you are?\" \"When was the first day of your last normal menstrual period?\"      10w3d   5. ULTRASOUND: \"Have you had an ultrasound during this pregnancy?\"  Note: To confirm intrauterine pregnancy, placenta location.      Yes, us performed  6. HEMODYNAMIC STATUS: \"Are you weak or feeling lightheaded?\" If Yes, ask: \"Can you stand and walk normally?\"       denies  7. OTHER SYMPTOMS: \"What other symptoms are you having with the bleeding?\" (e.g., passed tissue, vaginal discharge, fever, menstrual-type cramps)      Small clots yesterday     Protocols used: Pregnancy - Vaginal " Bleeding Less Than 20 Weeks EGA-Adult-[place]

## 2024-11-25 NOTE — ED PROVIDER NOTES
"Time reflects when diagnosis was documented in both MDM as applicable and the Disposition within this note       Time User Action Codes Description Comment    2024 12:08 AM Joni Currie Add [O20.0] Threatened miscarriage     2024 12:09 AM Joni Currie Add [O46.90] Vaginal bleeding during pregnancy     2024 12:40 AM Joni Currie Add [R82.81] Pyuria           ED Disposition       ED Disposition   Discharge    Condition   Stable    Date/Time    12:08 AM    Comment   Ashley Noyola discharge to home/self care.                   Assessment & Plan       Medical Decision Making  Differential diagnose includes but not limited to: Threatened miscarriage, incomplete , complete , normal vaginal bleeding during pregnancy.    Patient came in with otherwise asymptomatic vaginal bleeding.  She describes it as a moderate amount in the toilet bowl with only 1 tiny clots, no tissue was noted.  She was extremely well-appearing on exam.  No abdominal tenderness.  Speculum exam showed small amount of dark red blood in the vaginal vault, cervical os closed.  Bedside POCUS showed viable IUP with heart rate 173.  Patient otherwise remained stable with no other complaints.  UA showed asymptomatic pyuria which we treated with a course of Keflex.  Patient instructed to follow closely with OB/GYN, for she states she has an appointment on the .  Patient understands and agrees with the plan.  Strict return precautions given if symptoms are worsening or not resolving.  Patient discharged in stable condition.      Portions of the record have been created with voice recognition software.  Occasional wrong word or \"sound a like\" substitution may have occurred due to the inherent limitations of voice recognition software.  Read the chart carefully and recognize, using context, where substitutions have occurred.       Amount and/or Complexity of Data Reviewed  Labs: " ordered.    Risk  Prescription drug management.             Medications - No data to display    ED Risk Strat Scores                                               History of Present Illness       Chief Complaint   Patient presents with    Threatened Miscarriage     Pt 10 week pregnant. States this evening went to sit rema to use the bathroom and began with vaginal bleeding. Denies pelvic cramping or other sx at this time.        Past Medical History:   Diagnosis Date    Anemia     History of prior pregnancy with SGA  2020    Incomplete  with delayed or excessive hemorrhage 10/14/2021    Migraine     Obesity     Possible exposure to STD 2024    Pregnancy with 37 weeks completed gestation 2020     (spontaneous vaginal delivery) 2020    Tobacco use 2024    Quit 2 months ago      Urinary tract infection     Vaginal hemorrhage 10/13/2021    Varicella     had chicken pox      Past Surgical History:   Procedure Laterality Date    DILATION AND EVACUATION N/A 10/14/2021    Procedure: DILATATION AND EVACUATION (D&E) (# OF WEEKS), suction D&C for remaining POC;  Surgeon: Dian Pedro MD;  Location: [facility];  Service: Gynecology    WISDOM TOOTH EXTRACTION        Family History   Problem Relation Age of Onset    Diabetes Mother     Hypertension Mother     Diabetes Father     Hypertension Father     No Known Problems Sister     No Known Problems Sister     Asthma Brother     No Known Problems Daughter     Bronchiolitis Daughter     No Known Problems Daughter     No Known Problems Son     No Known Problems Maternal Grandmother     Glaucoma Paternal Grandmother     Dementia Paternal Grandfather     Breast cancer Neg Hx     Colon cancer Neg Hx     Ovarian cancer Neg Hx       Social History     Tobacco Use    Smoking status: Former     Types: Cigarettes    Smokeless tobacco: Never    Tobacco comments:     Quit 2023   Vaping Use    Vaping status: Never Used   Substance Use  Topics    Alcohol use: Not Currently     Alcohol/week: 2.0 standard drinks of alcohol    Drug use: Not Currently     Types: Marijuana     Comment: unsure of last use      E-Cigarette/Vaping    E-Cigarette Use Never User     Start Date 13       E-Cigarette/Vaping Substances    Nicotine No     THC No     CBD No     Flavoring No     Other No     Unknown No       I have reviewed and agree with the history as documented.     34-year-old  female at approximately 10 weeks 2 days gestation presents with painless vaginal bleeding.  Patient states she went up to urinate just prior to arrival and had a significant mount of bright red blood.  She said there might have been a small clot but nothing significant, and no other tissue noted.  Denies abdominal pain/cramping, or any other symptoms at this time.        Review of Systems   Constitutional:  Negative for chills and fever.   HENT:  Negative for congestion, rhinorrhea and sneezing.    Eyes:  Negative for pain and visual disturbance.   Respiratory:  Negative for cough and shortness of breath.    Cardiovascular:  Negative for chest pain and palpitations.   Gastrointestinal:  Negative for abdominal pain, constipation, diarrhea, nausea and vomiting.   Genitourinary:  Positive for vaginal bleeding. Negative for dysuria and hematuria.   Musculoskeletal:  Negative for arthralgias and back pain.   Skin:  Negative for color change and rash.   Neurological:  Negative for syncope, weakness, numbness and headaches.   All other systems reviewed and are negative.          Objective       ED Triage Vitals   Temperature Pulse Blood Pressure Respirations SpO2 Patient Position - Orthostatic VS   24 2230   98.4 °F (36.9 °C) 86 124/73 16 98 % Sitting      Temp Source Heart Rate Source BP Location FiO2 (%) Pain Score    24 -- --    Oral Monitor Right arm        Vitals       Date and Time Temp Pulse SpO2 Resp BP Pain Score FACES Pain Rating User   11/25/24 0056 -- -- -- -- 132/77 -- -- PFG   11/24/24 2230 -- 91 98 % 16 99/71 -- -- DB   11/24/24 2037 98.4 °F (36.9 °C) 86 98 % 16 124/73 -- -- PS            Physical Exam  Vitals and nursing note reviewed. Exam conducted with a chaperone present.   Constitutional:       General: She is not in acute distress.     Appearance: She is not ill-appearing.   HENT:      Head: Normocephalic and atraumatic.      Right Ear: External ear normal.      Left Ear: External ear normal.      Nose: Nose normal. No congestion or rhinorrhea.      Mouth/Throat:      Mouth: Mucous membranes are moist.      Pharynx: Oropharynx is clear.   Eyes:      General: No scleral icterus.     Extraocular Movements: Extraocular movements intact.   Cardiovascular:      Rate and Rhythm: Normal rate and regular rhythm.      Pulses: Normal pulses.      Heart sounds: Normal heart sounds.   Pulmonary:      Effort: Pulmonary effort is normal.      Breath sounds: Normal breath sounds.   Abdominal:      Palpations: Abdomen is soft.      Tenderness: There is no abdominal tenderness.   Genitourinary:     General: Normal vulva.      Vagina: Normal.      Cervix: Cervical bleeding (Small amount of dark red blood in the vaginal vault) present.      Comments: Cervix closed  Musculoskeletal:         General: Normal range of motion.      Cervical back: Normal range of motion.   Skin:     General: Skin is warm and dry.   Neurological:      General: No focal deficit present.      Mental Status: She is alert and oriented to person, place, and time.   Psychiatric:         Mood and Affect: Mood normal.         Behavior: Behavior normal.         Results Reviewed       Procedure Component Value Units Date/Time    Urine Microscopic [[id number]]  (Abnormal) Collected: 11/24/24 2300    Lab Status: Final result Specimen: Urine, Clean Catch Updated: 11/25/24 0035     RBC, UA Innumerable /hpf      WBC,  UA 10-20 /hpf      Epithelial Cells Moderate /hpf      Bacteria, UA None Seen /hpf      URINE COMMENT --    UA w Reflex to Microscopic w Reflex to Culture [[id number]]  (Abnormal) Collected: 11/24/24 2300    Lab Status: Final result Specimen: Urine, Clean Catch Updated: 11/24/24 2350     Color, UA Brown     Clarity, UA Turbid     Specific Gravity, UA 1.032     pH, UA 6.5     Leukocytes, UA Small     Nitrite, UA Negative     Protein,  (2+) mg/dl      Glucose, UA Negative mg/dl      Ketones, UA Negative mg/dl      Urobilinogen, UA 2.0 mg/dl      Bilirubin, UA Negative     Occult Blood, UA Large     URINE COMMENT --    Urine culture [[id number]] Collected: 11/24/24 2300    Lab Status: In process Specimen: Urine, Clean Catch Updated: 11/24/24 2350    hCG, quantitative [[id number]]  (Abnormal) Collected: 11/24/24 2044    Lab Status: Final result Specimen: Blood from Arm, Right Updated: 11/24/24 2308     HCG, Quant 75,210.6 mIU/mL     Narrative:       Expected Ranges:    HCG results between 5.0 and 25.0 mIU/mL may be indicative of early pregnancy but should be interpreted in light of the total clinical presentation.    HCG can rise to detectable levels in talib and post menopausal women (0-11.6 mIU/mL).     Approximate               Approximate HCG  Gestation age          Concentration ( mIU/mL)  _____________          ______________________   Weeks                      HCG values  0.2-1                       5-50  1-2                           2-3                         100-5000  3-4                         500-62941  4-5                         1000-61502  5-6                         [id number]  6-8                         [id number]  8-12                        [id number]      POCT pregnancy, urine [[id number]]  (Abnormal) Collected: 11/24/24 2229    Lab Status: Final result Specimen: Urine Updated: 11/24/24 2229     EXT Preg Test, Ur Positive     Control Valid    Comprehensive metabolic panel [[id number]]   (Abnormal) Collected: 11/24/24 2044    Lab Status: Final result Specimen: Blood from Arm, Right Updated: 11/24/24 2131     Sodium 132 mmol/L      Potassium 5.2 mmol/L      Chloride 104 mmol/L      CO2 22 mmol/L      ANION GAP 6 mmol/L      BUN 11 mg/dL      Creatinine 0.70 mg/dL      Glucose 106 mg/dL      Calcium 8.9 mg/dL      AST 33 U/L      ALT 14 U/L      Alkaline Phosphatase 58 U/L      Total Protein 7.7 g/dL      Albumin 3.8 g/dL      Total Bilirubin 0.28 mg/dL      eGFR 115 ml/min/1.73sq m     Narrative:      National Kidney Disease Foundation guidelines for Chronic Kidney Disease (CKD):     Stage 1 with normal or high GFR (GFR > 90 mL/min/1.73 square meters)    Stage 2 Mild CKD (GFR = 60-89 mL/min/1.73 square meters)    Stage 3A Moderate CKD (GFR = 45-59 mL/min/1.73 square meters)    Stage 3B Moderate CKD (GFR = 30-44 mL/min/1.73 square meters)    Stage 4 Severe CKD (GFR = 15-29 mL/min/1.73 square meters)    Stage 5 End Stage CKD (GFR <15 mL/min/1.73 square meters)  Note: GFR calculation is accurate only with a steady state creatinine    CBC and differential [[id number]]  (Abnormal) Collected: 11/24/24 2044    Lab Status: Final result Specimen: Blood from Arm, Right Updated: 11/24/24 2102     WBC 8.28 Thousand/uL      RBC 3.92 Million/uL      Hemoglobin 11.5 g/dL      Hematocrit 35.3 %      MCV 90 fL      MCH 29.3 pg      MCHC 32.6 g/dL      RDW 15.9 %      MPV 9.9 fL      Platelets 256 Thousands/uL      nRBC 0 /100 WBCs      Segmented % 53 %      Immature Grans % 0 %      Lymphocytes % 38 %      Monocytes % 8 %      Eosinophils Relative 1 %      Basophils Relative 0 %      Absolute Neutrophils 4.34 Thousands/µL      Absolute Immature Grans 0.02 Thousand/uL      Absolute Lymphocytes 3.17 Thousands/µL      Absolute Monocytes 0.66 Thousand/µL      Eosinophils Absolute 0.07 Thousand/µL      Basophils Absolute 0.02 Thousands/µL             No orders to display       POC Pelvic US    Date/Time: 11/24/2024 11:09  PM    Performed by: Joni Currie DO  Authorized by: Joni Currie DO    Patient location:  ED  Procedure details:     Exam Type:  Diagnostic    Indications: evaluate for IUP and pregnant with vaginal bleeding      Assessment for: confirm intrauterine pregnancy    Uterine findings:     Intrauterine pregnancy: identified      Single gestation: identified      Fetal heart rate: identified      Fetal heart rate (bpm):  173  Interpretation:     Ultrasound impressions: normal      Pregnancy findings: IUP with threatened miscarriage        ED Medication and Procedure Management   Prior to Admission Medications   Prescriptions Last Dose Informant Patient Reported? Taking?   Multiple Vitamin (MULTIVITAMIN PO)  Self Yes No   Sig: Take by mouth   Patient not taking: Reported on 11/14/2024   Prenatal Vit-Fe Fumarate-FA (PRENATAL VITAMINS PO)  Self Yes No   Sig: Take by mouth   aspirin 81 mg chewable tablet   No No   Sig: Chew 2 tablets (162 mg total) daily Start taking at 12w0d of pregnancy (12/6/24) Do not start before December 6, 2024.   diphenhydramine, lidocaine, Al/Mg hydroxide, simethicone (Magic Mouthwash) SUSP  Self No No   Sig: Swish and spit 10 mL every 4 (four) hours as needed for mouth pain or discomfort   Patient not taking: Reported on 10/24/2024   ergocalciferol (VITAMIN D2) 50,000 units  Self No No   Sig: Take 1 capsule (50,000 Units total) by mouth once a week for 12 doses   hydrOXYzine HCL (ATARAX) 10 mg tablet  Self No No   Sig: Take 1 tablet (10 mg total) by mouth every 6 (six) hours as needed for anxiety   Patient not taking: Reported on 10/31/2024      Facility-Administered Medications: None     Patient's Medications   Discharge Prescriptions    CEPHALEXIN (KEFLEX) 500 MG CAPSULE    Take 1 capsule (500 mg total) by mouth every 12 (twelve) hours for 5 days       Start Date: 11/25/2024End Date: 11/30/2024       Order Dose: 500 mg       Quantity: 10 capsule    Refills: 0     No  discharge procedures on file.  ED SEPSIS DOCUMENTATION   Time reflects when diagnosis was documented in both MDM as applicable and the Disposition within this note       Time User Action Codes Description Comment    11/25/2024 12:08 AM Joni Currie [O20.0] Threatened miscarriage     11/25/2024 12:09 AM Joni Currie [O46.90] Vaginal bleeding during pregnancy     11/25/2024 12:40 AM Joni Currie [R82.81] Pyuria                  Joni Currie DO  11/25/24 0100

## 2024-11-26 PROBLEM — Z64.1 GRAND MULTIPARITY: Status: ACTIVE | Noted: 2024-11-26

## 2024-11-26 PROBLEM — Z34.92 PRENATAL CARE IN SECOND TRIMESTER: Status: ACTIVE | Noted: 2024-11-26

## 2024-11-26 LAB — BACTERIA UR CULT: NORMAL

## 2024-11-26 NOTE — PROGRESS NOTES
OB/GYN  PN Visit  Ashley Noyola  [id number]  2024  1:31 PM  NAKITA Marie    S: 34 y.o.  11w0d here for PN visit. Pregnancy complicated by grand multiparity, BMI of 39, hx of PPH.     OB complaints:  Denies c/o n/v/ha, no edema, no smoking, no DV.   No lof  No ctxns or signs of PTL.    She reports vaginal bleeding and cramping throughout the pregnancy, but worsened on  after IC. Bedside US completed in ED with IUP and . Bleeding has lessened with just spotting; no cramping.     O:    Pre-Maggie Vitals      Flowsheet Row Most Recent Value   Prenatal Assessment    Prenatal Vitals    Blood Pressure 112/78   Weight - Scale 106 kg (233 lb)   Urine Albumin/Glucose    Dilation/Effacement/Station    Vaginal Drainage    Edema           Physical Exam  Vitals and nursing note reviewed.   Constitutional:       Appearance: Normal appearance.   HENT:      Head: Normocephalic.   Eyes:      Conjunctiva/sclera: Conjunctivae normal.   Cardiovascular:      Rate and Rhythm: Normal rate and regular rhythm.      Heart sounds: Normal heart sounds.   Pulmonary:      Effort: Pulmonary effort is normal.      Breath sounds: Normal breath sounds.   Chest:   Breasts:     Right: Normal. No inverted nipple, mass, nipple discharge, skin change or tenderness.      Left: Normal. No inverted nipple, mass, nipple discharge, skin change or tenderness.   Abdominal:      General: Abdomen is flat.      Palpations: Abdomen is soft. There is no mass.      Tenderness: There is no abdominal tenderness. There is no right CVA tenderness or left CVA tenderness.   Genitourinary:     General: Normal vulva.      Exam position: Lithotomy position.      Pubic Area: No rash or pubic lice.       Labia:         Right: No rash or tenderness.         Left: No rash or tenderness.       Urethra: No prolapse or urethral pain.      Vagina: Normal. No vaginal discharge.      Cervix: Normal.      Uterus: Normal.       Adnexa: Right adnexa  normal and left adnexa normal.        Right: No mass or tenderness.          Left: No mass or tenderness.        Comments: Scant brown discharge noted. Cervix is normal- no lesions or polyps noted.   Musculoskeletal:         General: Normal range of motion.      Cervical back: Normal range of motion. No tenderness.      Right lower leg: No edema.      Left lower leg: No edema.   Lymphadenopathy:      Cervical: No cervical adenopathy.      Upper Body:      Right upper body: No supraclavicular or axillary adenopathy.      Left upper body: No supraclavicular or axillary adenopathy.      Lower Body: No right inguinal adenopathy. No left inguinal adenopathy.   Skin:     General: Skin is warm and dry.      Findings: No rash.   Neurological:      Mental Status: She is alert and oriented to person, place, and time.   Psychiatric:         Mood and Affect: Mood normal.         Behavior: Behavior normal.         Thought Content: Thought content normal.         Judgment: Judgment normal.           Gen: no acute distress, nonlabored breathing.  OB exam completed: fundal height, +FHT.  Urine: -/-    A/P:  Problem List Items Addressed This Visit       H/O postpartum hemorrhage, currently pregnant    Blood Type: O+         BMI 39.0-39.9,adult    Prenatal care in second trimester - Primary    - Continue PNV  - Labor precautions reviewed  - Fetal movement reviewed  - Labs: UTD, advised to complete early 1 hr gtt  -Pap: 2022 NILM/HPV-  -Rh: O+  - Genetics: MFM scheduled 12/9  - Ultrasounds: MFM scheduled 12/9  - Tdap: offer at 28 weeks   - Flu Shot: Will offer in season  - RSV:  Will offer during season (9/1-1/31) @ [id number]-23k4n   - Rhogam: n/a  - Delivery: TBD  - Contraception: TBD  - Breastfeeding: TBD  - Pediatrician: TBD  -Delivery Consent & Packet: 30 weeks  -GBS: at 36 weeks  - RTO in 4 weeks           Relevant Orders    Trichomonas vaginalis Thin prep    Chlamydia/GC amplified DNA by PCR    Grand multiparity    Vaginal bleeding  in pregnancy    -Bleeding and PTL precautions reviewed with patient.          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 Knapp reached out to patient to discuss results and recommendation by [facility]. Dr. Alberto advised patient to go to [facility] for additional blood tests due to risk of fetal anemia.             NAKITA Marie  2024  1:31 PM

## 2024-11-26 NOTE — ASSESSMENT & PLAN NOTE
- Continue PNV  - Labor precautions reviewed  - Fetal movement reviewed  - Labs: UTD, advised to complete early 1 hr gtt  -Pap: 2022 NILM/HPV-  -Rh: O+  - Genetics: MFM scheduled 12/9  - Ultrasounds: M scheduled 12/9  - Tdap: offer at 28 weeks   - Flu Shot: Will offer in season  - RSV:  Will offer during season (9/1-1/31) @ 41a0a-67w1z   - Rhogam: n/a  - Delivery: TBD  - Contraception: TBD  - Breastfeeding: TBD  - Pediatrician: TBD  -Delivery Consent & Packet: 30 weeks  -GBS: at 36 weeks  - RTO in 4 weeks

## 2024-11-29 ENCOUNTER — TELEPHONE (OUTPATIENT)
Dept: LABOR AND DELIVERY | Facility: HOSPITAL | Age: 34
End: 2024-11-29

## 2024-11-29 ENCOUNTER — INITIAL PRENATAL (OUTPATIENT)
Dept: OBGYN CLINIC | Facility: CLINIC | Age: 34
End: 2024-11-29
Payer: COMMERCIAL

## 2024-11-29 ENCOUNTER — RESULTS FOLLOW-UP (OUTPATIENT)
Dept: OBGYN CLINIC | Facility: CLINIC | Age: 34
End: 2024-11-29

## 2024-11-29 ENCOUNTER — CLINICAL SUPPORT (OUTPATIENT)
Facility: HOSPITAL | Age: 34
End: 2024-11-29
Payer: COMMERCIAL

## 2024-11-29 ENCOUNTER — APPOINTMENT (OUTPATIENT)
Dept: LAB | Facility: HOSPITAL | Age: 34
End: 2024-11-29
Attending: STUDENT IN AN ORGANIZED HEALTH CARE EDUCATION/TRAINING PROGRAM
Payer: COMMERCIAL

## 2024-11-29 VITALS
SYSTOLIC BLOOD PRESSURE: 112 MMHG | WEIGHT: 233 LBS | BODY MASS INDEX: 39.78 KG/M2 | HEIGHT: 64 IN | DIASTOLIC BLOOD PRESSURE: 78 MMHG

## 2024-11-29 DIAGNOSIS — O36.1910: ICD-10-CM

## 2024-11-29 DIAGNOSIS — O46.90 VAGINAL BLEEDING IN PREGNANCY: ICD-10-CM

## 2024-11-29 DIAGNOSIS — O09.299 H/O POSTPARTUM HEMORRHAGE, CURRENTLY PREGNANT: ICD-10-CM

## 2024-11-29 DIAGNOSIS — Z36.9 ENCOUNTER FOR ANTENATAL SCREENING, UNSPECIFIED: Primary | ICD-10-CM

## 2024-11-29 DIAGNOSIS — Z34.81 PRENATAL CARE, SUBSEQUENT PREGNANCY, FIRST TRIMESTER: ICD-10-CM

## 2024-11-29 DIAGNOSIS — Z31.430 ENCOUNTER OF FEMALE FOR TESTING FOR GENETIC DISEASE CARRIER STATUS FOR PROCREATIVE MANAGEMENT: ICD-10-CM

## 2024-11-29 DIAGNOSIS — Z64.1 GRAND MULTIPARITY: ICD-10-CM

## 2024-11-29 DIAGNOSIS — Z34.92 PRENATAL CARE IN SECOND TRIMESTER: Primary | ICD-10-CM

## 2024-11-29 DIAGNOSIS — O24.410 DIET CONTROLLED GESTATIONAL DIABETES MELLITUS (GDM) IN FIRST TRIMESTER: Primary | ICD-10-CM

## 2024-11-29 DIAGNOSIS — R76.0 ABNORMAL ANTIBODY TITER: ICD-10-CM

## 2024-11-29 PROBLEM — R89.9 ABNORMAL LABORATORY TEST RESULT: Status: ACTIVE | Noted: 2024-11-29

## 2024-11-29 PROCEDURE — 36415 COLL VENOUS BLD VENIPUNCTURE: CPT | Performed by: OBSTETRICS & GYNECOLOGY

## 2024-11-29 PROCEDURE — 87661 TRICHOMONAS VAGINALIS AMPLIF: CPT

## 2024-11-29 PROCEDURE — 36415 COLL VENOUS BLD VENIPUNCTURE: CPT

## 2024-11-29 PROCEDURE — 99213 OFFICE O/P EST LOW 20 MIN: CPT

## 2024-11-29 PROCEDURE — 87491 CHLMYD TRACH DNA AMP PROBE: CPT

## 2024-11-29 PROCEDURE — 87591 N.GONORRHOEAE DNA AMP PROB: CPT

## 2024-11-29 NOTE — PROGRESS NOTES
[place] blood work drawn from patients left arm by [facility] with a 21 gauge straight stick needle. [place] testing ordered by [facility] per Dr. Alberto's request.

## 2024-11-29 NOTE — ASSESSMENT & PLAN NOTE
-Anti-K Titer : 256   -Dr. Knapp reached out to patient to discuss results and recommendation by [facility]. Dr. Alberto advised patient to go to [facility] for additional blood tests due to risk of fetal anemia.

## 2024-11-29 NOTE — TELEPHONE ENCOUNTER
Prenatal labs notable for Anti- K antibody. Discussed results with patient. We briefly reviewed positive antibody and potential risk for HDN if baby has [place] antigen. Discussed with M on call- recommend she reports to [facility] office today for NIPT to determine fetal antigen status. She reports she is able to present to office today for testing.

## 2024-12-03 ENCOUNTER — RESULTS FOLLOW-UP (OUTPATIENT)
Dept: OBGYN CLINIC | Facility: CLINIC | Age: 34
End: 2024-12-03

## 2024-12-03 DIAGNOSIS — A59.9 TRICHOMONAS INFECTION: Primary | ICD-10-CM

## 2024-12-03 LAB
C TRACH DNA SPEC QL NAA+PROBE: NEGATIVE
N GONORRHOEA DNA SPEC QL NAA+PROBE: NEGATIVE
T VAGINALIS DNA SPEC QL NAA+PROBE: POSITIVE

## 2024-12-03 RX ORDER — METRONIDAZOLE 500 MG/1
500 TABLET ORAL EVERY 12 HOURS SCHEDULED
Qty: 14 TABLET | Refills: 0 | Status: SHIPPED | OUTPATIENT
Start: 2024-12-03 | End: 2024-12-10

## 2024-12-05 LAB — SCAN RESULT: NORMAL

## 2024-12-12 ENCOUNTER — TELEPHONE (OUTPATIENT)
Dept: PERINATAL CARE | Facility: CLINIC | Age: 34
End: 2024-12-12

## 2024-12-12 ENCOUNTER — RESULTS FOLLOW-UP (OUTPATIENT)
Dept: PERINATAL CARE | Facility: OTHER | Age: 34
End: 2024-12-12

## 2024-12-12 NOTE — LETTER
December 12, 2024     Susie Knapp MD  [address] [facility] [address]    Patient: Ashley Noyola   YOB: 1990   Date of Visit: 12/12/2024       Dear Dr. Knapp:    Thank you for referring Ashley Noyola to me for evaluation. Her Kari antigen status is NEGATIVE; therefore, her pregnancy is not at increased risk of HDFN.      If you have questions, please do not hesitate to call me. I look forward to following your patient along with you.         Sincerely,        Nik Alberto MD        CC: No Recipients

## 2024-12-12 NOTE — TELEPHONE ENCOUNTER
L/M for patient regarding [place] cell free fetal DNA test result; low risk for trisomy 21, 13, 18 and sex chromosome aneuploidies.  Did not leave fetal sex on voicemail, but told patient results are available in "[employer]t if she wanted that information, or she can call.  Also let her know that Kari Antigen was NOT detected which is great news!!  This significantly reduces the chance of hemolytic disease of the .  MSAFP needs to be drawn between 16-18 weeks gestation and will be ordered by her OB office.  Encouraged her to call with any questions or concerns and provided my direct phone number.

## 2024-12-12 NOTE — RESULT ENCOUNTER NOTE
I have reviewed the results of the NIPS which are low risk.  Please call patient and notify her of these reassuring results if she has not viewed on MyChart. Please ensure she is notified of recommendation of MSAFP to be ordered and followed up through her primary Obstetrician's office.      Thank you, Nik Alberto MD

## 2024-12-13 ENCOUNTER — APPOINTMENT (OUTPATIENT)
Dept: LAB | Facility: CLINIC | Age: 34
End: 2024-12-13
Payer: COMMERCIAL

## 2024-12-13 PROBLEM — O24.410 DIET CONTROLLED GESTATIONAL DIABETES MELLITUS (GDM) IN FIRST TRIMESTER: Status: ACTIVE | Noted: 2024-12-13

## 2024-12-13 LAB — GLUCOSE 1H P 50 G GLC PO SERPL-MCNC: 215 MG/DL (ref 70–134)

## 2024-12-13 PROCEDURE — 82950 GLUCOSE TEST: CPT

## 2024-12-19 ENCOUNTER — ROUTINE PRENATAL (OUTPATIENT)
Facility: HOSPITAL | Age: 34
End: 2024-12-19
Attending: STUDENT IN AN ORGANIZED HEALTH CARE EDUCATION/TRAINING PROGRAM
Payer: COMMERCIAL

## 2024-12-19 ENCOUNTER — TELEPHONE (OUTPATIENT)
Facility: HOSPITAL | Age: 34
End: 2024-12-19

## 2024-12-19 VITALS
HEIGHT: 64 IN | WEIGHT: 235.23 LBS | HEART RATE: 91 BPM | DIASTOLIC BLOOD PRESSURE: 78 MMHG | OXYGEN SATURATION: 100 % | BODY MASS INDEX: 40.16 KG/M2 | SYSTOLIC BLOOD PRESSURE: 114 MMHG

## 2024-12-19 DIAGNOSIS — O99.211 OBESITY AFFECTING PREGNANCY IN FIRST TRIMESTER, UNSPECIFIED OBESITY TYPE: ICD-10-CM

## 2024-12-19 DIAGNOSIS — Z3A.13 13 WEEKS GESTATION OF PREGNANCY: ICD-10-CM

## 2024-12-19 DIAGNOSIS — O09.299 H/O POSTPARTUM HEMORRHAGE, CURRENTLY PREGNANT: ICD-10-CM

## 2024-12-19 DIAGNOSIS — O24.410 DIET CONTROLLED GESTATIONAL DIABETES MELLITUS (GDM) IN FIRST TRIMESTER: ICD-10-CM

## 2024-12-19 DIAGNOSIS — Z64.1 GRAND MULTIPARITY: ICD-10-CM

## 2024-12-19 DIAGNOSIS — Z36.82 ENCOUNTER FOR (NT) NUCHAL TRANSLUCENCY SCAN: Primary | ICD-10-CM

## 2024-12-19 DIAGNOSIS — O36.1910: ICD-10-CM

## 2024-12-19 PROCEDURE — 99244 OFF/OP CNSLTJ NEW/EST MOD 40: CPT | Performed by: STUDENT IN AN ORGANIZED HEALTH CARE EDUCATION/TRAINING PROGRAM

## 2024-12-19 PROCEDURE — 76813 OB US NUCHAL MEAS 1 GEST: CPT | Performed by: STUDENT IN AN ORGANIZED HEALTH CARE EDUCATION/TRAINING PROGRAM

## 2024-12-19 PROCEDURE — 76801 OB US < 14 WKS SINGLE FETUS: CPT | Performed by: STUDENT IN AN ORGANIZED HEALTH CARE EDUCATION/TRAINING PROGRAM

## 2024-12-19 NOTE — TELEPHONE ENCOUNTER
Per AVS on 12/19, PT needs 17 week early anatomy u/s. Unable to schedule in office, called and LVM w/office number for PT to schedule u/s at another location besides [place].

## 2024-12-19 NOTE — PROGRESS NOTES
This patient received  care under my supervision on 24 at 13w6d gestational age at [facility].  The note is contained in the ultrasound report located under OB Procedures tab in Epic.  Please call our office at [phone] with questions.  -Ruthann Wheeler MD

## 2024-12-19 NOTE — LETTER
2024     Susie Knapp MD  [address] [facility] [address]    Patient: Ashley Noyola   YOB: 1990   Date of Visit: 2024       Dear Dr. Knapp:    Thank you for referring Ashley Noyola to me for evaluation. Below are my notes for this consultation.    If you have questions, please do not hesitate to call me. I look forward to following your patient along with you.         Sincerely,        Ruthann Wheeler MD        CC: No Recipients    Ruthann Wheeler MD  2024 11:01 AM  Sign when Signing Visit  This patient received  care under my supervision on 24 at 13w6d gestational age at [facility].  The note is contained in the ultrasound report located under OB Procedures tab in Epic.  Please call our office at [phone] with questions.  -Ruthann Wheeler MD     Phoned pt, advised of note below. Pt verbalizes understanding.

## 2024-12-21 DIAGNOSIS — Z34.92 PRENATAL CARE IN SECOND TRIMESTER: Primary | ICD-10-CM

## 2024-12-21 PROBLEM — O43.199 PLACENTAL CYST AFFECTING PREGNANCY: Status: ACTIVE | Noted: 2024-12-21

## 2024-12-26 NOTE — PROGRESS NOTES
OB/GYN  PN Visit  Ashley Noyola  [id number]  2024  11:27 AM  Shahla Dumontley NAKITA Cardoso    S: 34 y.o.  15w0d here for PN visit.   She denies contractions. She denies leakage of fluid and vaginal bleeding.   She endorses good fetal movement.   She denies nausea, vomiting, headache,  edema, and smoking.   (+) cramping - intermittent and infrequent.   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     O:  Pre-Maggie Vitals      Flowsheet Row Most Recent Value   Prenatal Assessment    Fetal Heart Rate 160   Movement Absent   Prenatal Vitals    Blood Pressure 112/68   Weight - Scale 109 kg (241 lb 3.2 oz)   Urine Albumin/Glucose    Dilation/Effacement/Station    Vaginal Drainage    Edema           Wt= 241 lbs, BMI 41.4; TWG=9.616 kg (21 lb 3.2 oz)    Physical Exam    General: Well appearing, no distress  Respiratory: Unlabored breathing  Cardiovascular: Regular rate.  Abdomen: Soft, gravid, non tender  Extremities: Warm and well perfused.  Non tender.  OB exam completed:  +FHT.  Urine: -/-     A/P:  1. 15w0d GESTATION  - Continue PNV and aspirin  - Labor precautions reviewed  - Labs: UTD, needs to complete HGBA1C. Elevated 1 hour GTT dx with GDM.   - Pap:  NILM, -HPV    GC/CT: negative . + trich 24 and treated. Needs MARK.   - Genetics: negative SMA, NIPT low risk. AFP ordered. Encouraged to complete between 16-18 weeks.   - Ultrasounds: level 1  normal growth. Placental cyst identified. Level 2 scheduled  24  - Tdap: 28 weeks  - Flu Shot: unknown if she's received it this season.    - Rhogam: O positive. Kari antigen detected.   - Delivery:  at [facility].   - Contraception: TBD  - Breastfeeding: will breastfeed.   - Pediatrician:  pediatrician established.     Discussed rescheduling diabetes educator appointment. Briefly discussed healthy diet choices choosing healthier carbohydrate option and limiting sugar intake.  Also increasing exercise as tolerable.   Will obtain MARK for Trichomonas at next appointment.   Reviewed common discomforts of pregnancy in second trimester and warning signs.  Reviewed  labor precautions and when to seek immediate attention.   Advised to continue medications PNV and aspirin and return in 4 weeks.    Problem List Items Addressed This Visit    None  Prenatal care in second trimester.  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  NO need for MCA Dopplers or early delivery for the indication of alloimmunization.    Needs type and screen drawn 72 hours prior to delivery to ensure adequate blood supply available.   Gestational Diabetes in second trimester  Obesity affecting pregnancy in second trimester  Trichomonas infection affecting pregnancy     NAKITA Morales  2024  11:27 AM

## 2024-12-27 ENCOUNTER — ROUTINE PRENATAL (OUTPATIENT)
Dept: OBGYN CLINIC | Facility: CLINIC | Age: 34
End: 2024-12-27
Payer: COMMERCIAL

## 2024-12-27 ENCOUNTER — TELEPHONE (OUTPATIENT)
Age: 34
End: 2024-12-27

## 2024-12-27 VITALS — SYSTOLIC BLOOD PRESSURE: 112 MMHG | BODY MASS INDEX: 41.4 KG/M2 | WEIGHT: 241.2 LBS | DIASTOLIC BLOOD PRESSURE: 68 MMHG

## 2024-12-27 DIAGNOSIS — O36.1920 KELL ISOIMMUNIZATION DURING PREGNANCY IN SECOND TRIMESTER, SINGLE OR UNSPECIFIED FETUS: ICD-10-CM

## 2024-12-27 DIAGNOSIS — Z34.92 PRENATAL CARE IN SECOND TRIMESTER: Primary | ICD-10-CM

## 2024-12-27 DIAGNOSIS — O43.192 PLACENTAL CYST AFFECTING PREGNANCY IN SECOND TRIMESTER: ICD-10-CM

## 2024-12-27 DIAGNOSIS — O24.410 DIET CONTROLLED GESTATIONAL DIABETES MELLITUS (GDM) IN SECOND TRIMESTER: ICD-10-CM

## 2024-12-27 DIAGNOSIS — O09.299 H/O POSTPARTUM HEMORRHAGE, CURRENTLY PREGNANT: ICD-10-CM

## 2024-12-27 DIAGNOSIS — Z64.1 GRAND MULTIPARITY: ICD-10-CM

## 2024-12-27 PROBLEM — O99.212 OBESITY AFFECTING PREGNANCY IN SECOND TRIMESTER: Status: ACTIVE | Noted: 2024-12-27

## 2024-12-27 PROCEDURE — 99214 OFFICE O/P EST MOD 30 MIN: CPT

## 2024-12-27 NOTE — ASSESSMENT & PLAN NOTE
"Encouraged to complete HGBA1C. And to reschedule diabetes educator appointment.     No results found for: \"HGBA1C\"  "

## 2024-12-27 NOTE — ASSESSMENT & PLAN NOTE
High titer of 256  [place] NIPT for kari antigen status drawn on 11/29  [place] NIPT indicates Kari Antigen NOT DETECTED; therefore, pregnancy not at risk for HDFN.  NO need for MCA Dopplers or early delivery for the indication of alloimmunization.

## 2025-01-13 ENCOUNTER — TELEMEDICINE (OUTPATIENT)
Facility: HOSPITAL | Age: 35
End: 2025-01-13
Payer: COMMERCIAL

## 2025-01-13 VITALS — WEIGHT: 241 LBS | HEIGHT: 64 IN | BODY MASS INDEX: 41.15 KG/M2

## 2025-01-13 DIAGNOSIS — E55.9 VITAMIN D DEFICIENCY: ICD-10-CM

## 2025-01-13 DIAGNOSIS — Z3A.17 17 WEEKS GESTATION OF PREGNANCY: ICD-10-CM

## 2025-01-13 DIAGNOSIS — E66.813 CLASS 3 SEVERE OBESITY DUE TO EXCESS CALORIES WITHOUT SERIOUS COMORBIDITY WITH BODY MASS INDEX (BMI) OF 40.0 TO 44.9 IN ADULT (HCC): ICD-10-CM

## 2025-01-13 DIAGNOSIS — O24.410 DIET CONTROLLED GESTATIONAL DIABETES MELLITUS (GDM) IN SECOND TRIMESTER: Primary | ICD-10-CM

## 2025-01-13 DIAGNOSIS — E66.01 CLASS 3 SEVERE OBESITY DUE TO EXCESS CALORIES WITHOUT SERIOUS COMORBIDITY WITH BODY MASS INDEX (BMI) OF 40.0 TO 44.9 IN ADULT (HCC): ICD-10-CM

## 2025-01-13 PROCEDURE — 99215 OFFICE O/P EST HI 40 MIN: CPT | Performed by: NURSE PRACTITIONER

## 2025-01-13 PROCEDURE — 99417 PROLNG OP E/M EACH 15 MIN: CPT | Performed by: NURSE PRACTITIONER

## 2025-01-13 RX ORDER — CHOLECALCIFEROL (VITAMIN D3) 25 MCG
2 CAPSULE ORAL
Qty: 60 CAPSULE | Refills: 5 | Status: SHIPPED | OUTPATIENT
Start: 2025-01-13

## 2025-01-13 RX ORDER — BLOOD-GLUCOSE METER
KIT MISCELLANEOUS
Qty: 1 KIT | Refills: 0 | Status: SHIPPED | OUTPATIENT
Start: 2025-01-13

## 2025-01-13 RX ORDER — LANCETS 33 GAUGE
EACH MISCELLANEOUS
Qty: 100 EACH | Refills: 5 | Status: SHIPPED | OUTPATIENT
Start: 2025-01-13

## 2025-01-13 RX ORDER — BLOOD SUGAR DIAGNOSTIC
STRIP MISCELLANEOUS
Qty: 100 STRIP | Refills: 5 | Status: SHIPPED | OUTPATIENT
Start: 2025-01-13

## 2025-01-13 NOTE — ASSESSMENT & PLAN NOTE
-Check A1c and CMP.  -A1c goal is 5.6% or less.  -Follow up with dietitian.  -Keep 3 day food log to review with dietitian.  -Start self monitoring blood glucose (SMBG) fasting; 2 hours after start of each meal and with hypoglycemia.   -Glucose goals: fasting 60-95 mg/dL, 140 mg/dL or less 1 hour post meals, and 120 mg/dL or less 2 hours post meal.   -Report glucose readings weekly via [employer] every Monday.  -Start GDM meal plan with 3 meals and 3 snacks including recommended combination of carb, protein and fat per meal/snack.  -Please eat meal or snack every 2-3.5 hours while awake.  -No more than 8 to 10 hours of fasting overnight.  -Refer to Sweet Success MyPlate online as a reference.  -2nd/3rd trimester minimum total daily carbohydrates 175 grams paired with half grams in protein.   -Stay active if no restriction from your OB, walk up to 30 minutes a day.  -Always have glucose available to treat hypoglycemia. Use 15:15 rule.   -Refer to hypoglycemia patient education sheet. SMBG when experiencing signs and symptoms of hypoglycemia and prior to driving.   -Serial fetal growth ultrasounds.  -20 weeks detailed fetal growth ultrasound.  -22-24 weeks fetal echo.  -If diabetes related medications are started, at 32 weeks gestation; NST twice a week and NISA weekly.   -Continue prenatal vitamin and baby aspirin as recommended.  -Due to vitamin D deficiency, add Vitamin D3 2000 iu capsule, take with dietary calcium.   -At 36 weeks gestation, stop baby aspirin.   -Continue follow-up with your OB and MFM as recommended.  -Stay in close contact with diabetes education team.  -Insulin requirements during pregnancy; basal/bolus concept and Metformin discussed.  -Very important to maintain tight glucose control during pregnancy to decrease risk factors including fetal macrosomia; birth injury; risk of ; polyhydramnios; pre-term labor; pre-eclampsia;  hypoglycemia; jaundice and stillbirth.   -Diabetes and  "pregnancy booklet; meal plan and hypoglycemia patient education.       No results found for: \"HGBA1C\"    Orders:    Ambulatory Referral to Maternal Fetal Medicine    Cholecalciferol (Vitamin D-3) 25 MCG (1000 UT) CAPS; Take 2 capsules (2,000 Units total) by mouth Daily at 2am    Lancets (OneTouch Delica Plus [id number]) MISC; Use 4 a day. GDM    glucose blood (OneTouch Verio) test strip; Test 4 times a day. GDM    Blood Glucose Monitoring Suppl (OneTouch Verio Reflect) w/Device KIT; Dispense 1 kit per insurance formulary. GDM    Mychart glucose flowsheet    Comprehensive metabolic panel; Future    Hemoglobin A1C; Future    "

## 2025-01-13 NOTE — ASSESSMENT & PLAN NOTE
Orders:    Cholecalciferol (Vitamin D-3) 25 MCG (1000 UT) CAPS; Take 2 capsules (2,000 Units total) by mouth Daily at 2am    Lancets (OneTouch Delica Plus [id number]) MISC; Use 4 a day. GDM    glucose blood (OneTouch Verio) test strip; Test 4 times a day. GDM    Blood Glucose Monitoring Suppl (OneTouch Verio Reflect) w/Device KIT; Dispense 1 kit per insurance formulary. GDM    Mychart glucose flowsheet    Comprehensive metabolic panel; Future    Hemoglobin A1C; Future

## 2025-01-13 NOTE — ASSESSMENT & PLAN NOTE
-Pre-pregnancy weight 221 lbs. BMI 37.74.  -Current weight 241 lbs.   -Recommended weight gain.  -Start GDM meal plan and follow up with dietitian.     Orders:    Cholecalciferol (Vitamin D-3) 25 MCG (1000 UT) CAPS; Take 2 capsules (2,000 Units total) by mouth Daily at 2am    Lancets (OneTouch Delica Plus [id number]) MISC; Use 4 a day. GDM    glucose blood (OneTouch Verio) test strip; Test 4 times a day. GDM    Blood Glucose Monitoring Suppl (OneTouch Verio Reflect) w/Device KIT; Dispense 1 kit per insurance formulary. GDM    Mychart glucose flowsheet    Comprehensive metabolic panel; Future    Hemoglobin A1C; Future

## 2025-01-13 NOTE — PROGRESS NOTES
Virtual Regular Visit  Name: Ashley Noyola      : 1990      MRN: [id number]  Encounter Provider: NAKITA Arriola  Encounter Date: 2025   Encounter department: [facility]      Verification of patient location:  Patient is located at Home in the following state in which I hold an active license [place] :  Assessment & Plan  Diet controlled gestational diabetes mellitus (GDM) in second trimester  -Check A1c and CMP.  -A1c goal is 5.6% or less.  -Follow up with dietitian.  -Keep 3 day food log to review with dietitian.  -Start self monitoring blood glucose (SMBG) fasting; 2 hours after start of each meal and with hypoglycemia.   -Glucose goals: fasting 60-95 mg/dL, 140 mg/dL or less 1 hour post meals, and 120 mg/dL or less 2 hours post meal.   -Report glucose readings weekly via Fios every Monday.  -Start GDM meal plan with 3 meals and 3 snacks including recommended combination of carb, protein and fat per meal/snack.  -Please eat meal or snack every 2-3.5 hours while awake.  -No more than 8 to 10 hours of fasting overnight.  -Refer to Sweet Success MyPlate online as a reference.  -2nd/3rd trimester minimum total daily carbohydrates 175 grams paired with half grams in protein.   -Stay active if no restriction from your OB, walk up to 30 minutes a day.  -Always have glucose available to treat hypoglycemia. Use 15:15 rule.   -Refer to hypoglycemia patient education sheet. SMBG when experiencing signs and symptoms of hypoglycemia and prior to driving.   -Serial fetal growth ultrasounds.  -20 weeks detailed fetal growth ultrasound.  -22-24 weeks fetal echo.  -If diabetes related medications are started, at 32 weeks gestation; NST twice a week and NISA weekly.   -Continue prenatal vitamin and baby aspirin as recommended.  -Due to vitamin D deficiency, add Vitamin D3 2000 iu capsule, take with dietary calcium.   -At 36 weeks gestation, stop baby aspirin.   -Continue follow-up with your  "OB and MFM as recommended.  -Stay in close contact with diabetes education team.  -Insulin requirements during pregnancy; basal/bolus concept and Metformin discussed.  -Very important to maintain tight glucose control during pregnancy to decrease risk factors including fetal macrosomia; birth injury; risk of ; polyhydramnios; pre-term labor; pre-eclampsia;  hypoglycemia; jaundice and stillbirth.   -Diabetes and pregnancy booklet; meal plan and hypoglycemia patient education.       No results found for: \"HGBA1C\"    Orders:    Ambulatory Referral to Maternal Fetal Medicine    Cholecalciferol (Vitamin D-3) 25 MCG (1000 UT) CAPS; Take 2 capsules (2,000 Units total) by mouth Daily at 2am    Lancets (OneTouch Delica Plus [id number]) MISC; Use 4 a day. GDM    glucose blood (OneTouch Verio) test strip; Test 4 times a day. GDM    Blood Glucose Monitoring Suppl (OneTouch Verio Reflect) w/Device KIT; Dispense 1 kit per insurance formulary. GDM    Mychart glucose flowsheet    Comprehensive metabolic panel; Future    Hemoglobin A1C; Future    17 weeks gestation of pregnancy    Orders:    Cholecalciferol (Vitamin D-3) 25 MCG (1000 UT) CAPS; Take 2 capsules (2,000 Units total) by mouth Daily at 2am    Lancets (OneTouch Delica Plus [id number]) MISC; Use 4 a day. GDM    glucose blood (OneTouch Verio) test strip; Test 4 times a day. GDM    Blood Glucose Monitoring Suppl (OneTouch Verio Reflect) w/Device KIT; Dispense 1 kit per insurance formulary. GDM    Mychart glucose flowsheet    Comprehensive metabolic panel; Future    Hemoglobin A1C; Future    Class 3 severe obesity due to excess calories without serious comorbidity with body mass index (BMI) of 40.0 to 44.9 in adult (HCC)  -Pre-pregnancy weight 221 lbs. BMI 37.74.  -Current weight 241 lbs.   -Recommended weight gain.  -Start GDM meal plan and follow up with dietitian.     Orders:    Cholecalciferol (Vitamin D-3) 25 MCG (1000 UT) CAPS; Take 2 capsules (2,000 Units " "total) by mouth Daily at 2am    Lancets (OneTouch Delica Plus [id number]) MISC; Use 4 a day. GDM    glucose blood (OneTouch Verio) test strip; Test 4 times a day. GDM    Blood Glucose Monitoring Suppl (OneTouch Verio Reflect) w/Device KIT; Dispense 1 kit per insurance formulary. GDM    Mychart glucose flowsheet    Comprehensive metabolic panel; Future    Hemoglobin A1C; Future    Vitamin D deficiency  -Last D level 11, completed D2 loading dose.  -Start Vitamin D3 2000 iu capsule daily, take with dietary calcium.  -Continue prenatal vitamin.     Orders:    Cholecalciferol (Vitamin D-3) 25 MCG (1000 UT) CAPS; Take 2 capsules (2,000 Units total) by mouth Daily at 2am    Lancets (OneTouch Delica Plus [id number]) MISC; Use 4 a day. GDM    glucose blood (OneTouch Verio) test strip; Test 4 times a day. GDM    Blood Glucose Monitoring Suppl (OneTouch Verio Reflect) w/Device KIT; Dispense 1 kit per insurance formulary. GDM    Mychart glucose flowsheet    Comprehensive metabolic panel; Future    Hemoglobin A1C; Future    Diet controlled gestational diabetes mellitus (GDM) in first trimester    No results found for: \"HGBA1C\"             Encounter provider NAKITA Arriola    The patient was identified by name and date of birth. Ashley Noyola was informed that this is a telemedicine visit and that the visit is being conducted through the Epic Embedded platform. She agrees to proceed..  My office door was closed. No one else was in the room.  She acknowledged consent and understanding of privacy and security of the video platform. The patient has agreed to participate and understands they can discontinue the visit at any time.    Patient is aware this is a billable service.     History of Present Illness   Ashley is a 35 yo , 17 3/7 weeks gestation GDM, no history of GDM, 1 hour . Family history of diabetes. Pending A1c. Has 12, 10, 8, 7 and 5 yo; highest baby weight 8 lbs; all vaginal deliveries.   Wakes up around " "7:30 AM for her kids then back to bed; wakes up between 11 AM to 12 PM, will eat leftovers or breakfast sandwich; 4 to 5 PM dinner-chicken, rice or mashed potatoes with veggies or a sandwich; 7 to 8 PM- leftovers from dinner. Bedtime 10 PM-MN. 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 Started pregnancy exercise video 30 minutes M-F.   Up to date weight gain 21 lbs, above recommended 11 to 20 lbs.   Vitamin D deficiency- low D level, completed weekly D2, recommended adding OTC D3.   HPI  Review of Systems   Constitutional:  Positive for fatigue. Negative for fever.   HENT:  Negative for congestion and trouble swallowing.    Eyes:  Negative for visual disturbance.   Respiratory:  Positive for shortness of breath (with exertion). Negative for cough.    Cardiovascular:  Negative for chest pain, palpitations and leg swelling.   Gastrointestinal:  Negative for constipation, nausea and vomiting.   Endocrine: Positive for polydipsia and polyuria. Negative for polyphagia.   Genitourinary:  Negative for difficulty urinating and vaginal bleeding.   Musculoskeletal:  Positive for back pain (intermittently).   Skin:  Negative for rash.   Neurological:  Positive for headaches. Negative for numbness.   Psychiatric/Behavioral:  Positive for sleep disturbance.        Objective   Ht 5' 4\" (1.626 m)   Wt 109 kg (241 lb)   LMP 09/04/2024   BMI 41.37 kg/m²     Physical Exam  Constitutional:       Appearance: She is obese.   HENT:      Head: Normocephalic.      Nose: Nose normal.   Eyes:      Conjunctiva/sclera: Conjunctivae normal.   Pulmonary:      Effort: Pulmonary effort is normal.   Neurological:      Mental Status: She is alert and oriented to person, place, and time.   Psychiatric:         Mood and Affect: Mood normal.         Behavior: Behavior normal.         Thought Content: Thought content normal.         Judgment: Judgment normal.         Visit Time  Total Visit Duration: 58 minutes with patient and 10 minutes charting/reviewing chart. "

## 2025-01-13 NOTE — ASSESSMENT & PLAN NOTE
-Last D level 11, completed D2 loading dose.  -Start Vitamin D3 2000 iu capsule daily, take with dietary calcium.  -Continue prenatal vitamin.     Orders:    Cholecalciferol (Vitamin D-3) 25 MCG (1000 UT) CAPS; Take 2 capsules (2,000 Units total) by mouth Daily at 2am    Lancets (OneTouch Delica Plus [id number]) MISC; Use 4 a day. GDM    glucose blood (OneTouch Verio) test strip; Test 4 times a day. GDM    Blood Glucose Monitoring Suppl (OneTouch Verio Reflect) w/Device KIT; Dispense 1 kit per insurance formulary. GDM    Mychart glucose flowsheet    Comprehensive metabolic panel; Future    Hemoglobin A1C; Future

## 2025-01-13 NOTE — PATIENT INSTRUCTIONS
-Check A1c and CMP.  -A1c goal is 5.6% or less.  -Follow up with dietitian.  -Keep 3 day food log to review with dietitian.  -Start self monitoring blood glucose (SMBG) fasting; 2 hours after start of each meal and with hypoglycemia.   -Glucose goals: fasting 60-95 mg/dL, 140 mg/dL or less 1 hour post meals, and 120 mg/dL or less 2 hours post meal.   -Report glucose readings weekly via SMRxTt every Monday.  -Start GDM meal plan with 3 meals and 3 snacks including recommended combination of carb, protein and fat per meal/snack.  -Please eat meal or snack every 2-3.5 hours while awake.  -No more than 8 to 10 hours of fasting overnight.  -Refer to [employer] MyPlate online as a reference.  -2nd/3rd trimester minimum total daily carbohydrates 175 grams paired with half grams in protein.   -Stay active if no restriction from your OB, walk up to 30 minutes a day.  -Always have glucose available to treat hypoglycemia. Use 15:15 rule.   -Refer to hypoglycemia patient education sheet. SMBG when experiencing signs and symptoms of hypoglycemia and prior to driving.   -Serial fetal growth ultrasounds.  -20 weeks detailed fetal growth ultrasound.  -22-24 weeks fetal echo.  -If diabetes related medications are started, at 32 weeks gestation; NST twice a week and NISA weekly.   -Continue prenatal vitamin and baby aspirin as recommended.  -Due to vitamin D deficiency, add Vitamin D3 2000 iu capsule, take with dietary calcium.   -At 36 weeks gestation, stop baby aspirin.   -Continue follow-up with your OB and MFM as recommended.  -Stay in close contact with diabetes education team.  -Insulin requirements during pregnancy; basal/bolus concept and Metformin discussed.  -Very important to maintain tight glucose control during pregnancy to decrease risk factors including fetal macrosomia; birth injury; risk of ; polyhydramnios; pre-term labor; pre-eclampsia;  hypoglycemia; jaundice and stillbirth.   -Diabetes and  pregnancy booklet; meal plan and hypoglycemia patient education.

## 2025-01-16 ENCOUNTER — TELEPHONE (OUTPATIENT)
Dept: PERINATAL CARE | Facility: CLINIC | Age: 35
End: 2025-01-16

## 2025-01-16 PROBLEM — Z3A.17 17 WEEKS GESTATION OF PREGNANCY: Status: RESOLVED | Noted: 2025-01-13 | Resolved: 2025-01-16

## 2025-01-16 NOTE — TELEPHONE ENCOUNTER
Left a message of appointment location and time change from [place] 1/17 to new appointment on 1/17/24 [facility] with a 10 AM arrival.  I left my direct number to call back with any questions.

## 2025-01-17 ENCOUNTER — ROUTINE PRENATAL (OUTPATIENT)
Facility: HOSPITAL | Age: 35
End: 2025-01-17
Payer: COMMERCIAL

## 2025-01-17 VITALS
HEART RATE: 93 BPM | HEIGHT: 64 IN | DIASTOLIC BLOOD PRESSURE: 70 MMHG | BODY MASS INDEX: 41.25 KG/M2 | WEIGHT: 241.6 LBS | SYSTOLIC BLOOD PRESSURE: 114 MMHG

## 2025-01-17 DIAGNOSIS — Z64.1 GRAND MULTIPARITY: ICD-10-CM

## 2025-01-17 DIAGNOSIS — O09.299 H/O POSTPARTUM HEMORRHAGE, CURRENTLY PREGNANT: ICD-10-CM

## 2025-01-17 DIAGNOSIS — O36.1920 KELL ISOIMMUNIZATION DURING PREGNANCY IN SECOND TRIMESTER, SINGLE OR UNSPECIFIED FETUS: ICD-10-CM

## 2025-01-17 DIAGNOSIS — O99.212 OBESITY AFFECTING PREGNANCY IN SECOND TRIMESTER, UNSPECIFIED OBESITY TYPE: ICD-10-CM

## 2025-01-17 DIAGNOSIS — Z36.3 ENCOUNTER FOR ANTENATAL SCREENING FOR MALFORMATION USING ULTRASOUND: ICD-10-CM

## 2025-01-17 DIAGNOSIS — O24.410 DIET CONTROLLED GESTATIONAL DIABETES MELLITUS (GDM) IN SECOND TRIMESTER: ICD-10-CM

## 2025-01-17 DIAGNOSIS — Z3A.18 18 WEEKS GESTATION OF PREGNANCY: Primary | ICD-10-CM

## 2025-01-17 DIAGNOSIS — Z36.86 ENCOUNTER FOR ANTENATAL SCREENING FOR CERVICAL LENGTH: ICD-10-CM

## 2025-01-17 PROBLEM — R89.9 ABNORMAL LABORATORY TEST RESULT: Status: RESOLVED | Noted: 2024-11-29 | Resolved: 2025-01-17

## 2025-01-17 PROCEDURE — 76805 OB US >/= 14 WKS SNGL FETUS: CPT | Performed by: OBSTETRICS & GYNECOLOGY

## 2025-01-17 PROCEDURE — 99213 OFFICE O/P EST LOW 20 MIN: CPT | Performed by: NURSE PRACTITIONER

## 2025-01-17 NOTE — PROGRESS NOTES
"[facility]: Ms. Noyola was seen today at 18w0d gestational age for  early fetal anatomic survey .  See ultrasound report under \"OB Procedures\" rickie Gonzalez NAKITA Noyola is here today for early detailed anatomic survey. Her genetic screening was low risk NIPT.  MSAFP is ordered.     Her medical history is significant for:  1.  Pregravid BMI of 37.4  2.  GDM A1  3.  Grand multiparity  4.  History of postpartum hemorrhage     A viable elizabeth intrauterine pregnancy is seen on today's ultrasound, measuring consistent with established EDC.  The fetal anatomic survey is incomplete today, with suboptimal/incomplete views as indicated above.  However, no fetal anomalies are suspected on today's survey. Amniotic fluid and placenta are within normal limits. Uterus and adnexa appear within normal limits.          Evaluation and Management:    The patient was counseled regarding the above findings. The limitations of ultrasound were reviewed.    We discussed the status of her gestational diabetes.  She had a class with Shayla on 2025.  She states she has not picked up her glucometer but plans to do so today and begin checking her sugars. We discussed the importance of glycemic control in prevention of complications including hypertensive disorders, macrosomia, polyhydramnios,  delivery, shoulder dystocia, stillbirth and hypoglycemia in .  We discussed the time sensitive nature of the MSAFP screening.  She is thinking to go on Monday for blood work.  Her NIPT results were reviewed.  Continue ASA until 36 weeks.       Split-shared decision-making between NAKITA Gonzalez and myself was utilized, with the majority of the time spent by MASSIMO Gonzalez  Medical decision-making for this encounter was low (diagnosis low, data limited and risk low).        Our recommendations are as follows:      1.  Extended ultrasound in 4 weeks for completion of the fetal anatomic survey " and transvaginal ultrasound.  2.  Fetal echocardiogram in 6 weeks    At the conclusion of today's encounter, all questions were answered to her satisfaction. Thank you very much for this kind referral and please do not hesitate to contact me with any further questions or concerns.

## 2025-01-17 NOTE — LETTER
"  .Date: 2025    Susie Knapp MD  [address] [address]    Patient: Ashley Noyola   YOB: 1990   Date of Visit: 2025   Gestational age 18w0d   Nature of this communication: Routine       This patient was seen recently in our  office.  Please see ultrasound report under \"OB Procedures\" tab.  Please don't hesitate to contact our office with any concerns or questions.      Sincerely,      Polly Rodriguez MD/MASSIMO MACE  Attending Physician, Maternal-Fetal Medicine  [facility]        "

## 2025-01-21 ENCOUNTER — TELEPHONE (OUTPATIENT)
Facility: HOSPITAL | Age: 35
End: 2025-01-21

## 2025-01-21 DIAGNOSIS — Z3A.18 18 WEEKS GESTATION OF PREGNANCY: ICD-10-CM

## 2025-01-21 DIAGNOSIS — O24.419 GESTATIONAL DIABETES MELLITUS (GDM) IN SECOND TRIMESTER, GESTATIONAL DIABETES METHOD OF CONTROL UNSPECIFIED: Primary | ICD-10-CM

## 2025-01-21 NOTE — TELEPHONE ENCOUNTER
No Show Documentation  Diabetes and Pregnancy Program    Ashley Noyola was a no show today (01/21/25) for Class 2 of Diabetes and Pregnancy Program.    Called pt. No Answer. Left VM. Sent Rhythm New[employer] Message. Pt was instructed to reach out to call [phone] to reschedule.    Lisa Hurd RD  Diabetes Educator   [facility]  Diabetes and Pregnancy Program

## 2025-01-24 ENCOUNTER — TELEPHONE (OUTPATIENT)
Dept: PERINATAL CARE | Facility: CLINIC | Age: 35
End: 2025-01-24

## 2025-02-08 DIAGNOSIS — E66.01 CLASS 3 SEVERE OBESITY DUE TO EXCESS CALORIES WITHOUT SERIOUS COMORBIDITY WITH BODY MASS INDEX (BMI) OF 40.0 TO 44.9 IN ADULT (HCC): ICD-10-CM

## 2025-02-08 DIAGNOSIS — E55.9 VITAMIN D DEFICIENCY: ICD-10-CM

## 2025-02-08 DIAGNOSIS — O24.410 DIET CONTROLLED GESTATIONAL DIABETES MELLITUS (GDM) IN SECOND TRIMESTER: ICD-10-CM

## 2025-02-08 DIAGNOSIS — Z3A.17 17 WEEKS GESTATION OF PREGNANCY: ICD-10-CM

## 2025-02-08 DIAGNOSIS — E66.813 CLASS 3 SEVERE OBESITY DUE TO EXCESS CALORIES WITHOUT SERIOUS COMORBIDITY WITH BODY MASS INDEX (BMI) OF 40.0 TO 44.9 IN ADULT (HCC): ICD-10-CM

## 2025-02-10 RX ORDER — FAMOTIDINE 20 MG
TABLET ORAL
Qty: 180 CAPSULE | Refills: 1 | Status: SHIPPED | OUTPATIENT
Start: 2025-02-10

## 2025-02-11 ENCOUNTER — ROUTINE PRENATAL (OUTPATIENT)
Facility: HOSPITAL | Age: 35
End: 2025-02-11
Payer: COMMERCIAL

## 2025-02-11 VITALS
HEART RATE: 110 BPM | SYSTOLIC BLOOD PRESSURE: 118 MMHG | BODY MASS INDEX: 41.91 KG/M2 | DIASTOLIC BLOOD PRESSURE: 78 MMHG | WEIGHT: 245.5 LBS | HEIGHT: 64 IN

## 2025-02-11 DIAGNOSIS — O36.1920 KELL ISOIMMUNIZATION DURING PREGNANCY IN SECOND TRIMESTER, SINGLE OR UNSPECIFIED FETUS: ICD-10-CM

## 2025-02-11 DIAGNOSIS — Z3A.21 21 WEEKS GESTATION OF PREGNANCY: Primary | ICD-10-CM

## 2025-02-11 DIAGNOSIS — O24.410 DIET CONTROLLED GESTATIONAL DIABETES MELLITUS (GDM) IN SECOND TRIMESTER: ICD-10-CM

## 2025-02-11 DIAGNOSIS — E66.01 OBESITY, CLASS III, BMI 40-49.9 (MORBID OBESITY) (HCC): ICD-10-CM

## 2025-02-11 PROCEDURE — 76805 OB US >/= 14 WKS SNGL FETUS: CPT | Performed by: OBSTETRICS & GYNECOLOGY

## 2025-02-11 PROCEDURE — 99214 OFFICE O/P EST MOD 30 MIN: CPT | Performed by: OBSTETRICS & GYNECOLOGY

## 2025-02-11 PROCEDURE — 76817 TRANSVAGINAL US OBSTETRIC: CPT | Performed by: OBSTETRICS & GYNECOLOGY

## 2025-02-11 NOTE — LETTER
February 15, 2025     NAKITA Morales  [address]   [address]  [place] [address]    Patient: Ashley Noyola   YOB: 1990   Date of Visit: 2/11/2025       Dear Ms. Cardoso:    Thank you for referring Ashley Noyola to me for evaluation. Below are my notes for this consultation.    If you have questions, please do not hesitate to call me. I look forward to following your patient along with you.         Sincerely,        Alfa Dill MD        CC: No Recipients    Alfa Dill MD  2/15/2025  1:26 PM  Sign when Signing Visit  A fetal ultrasound was completed. See Ob procedures in Epic for an interpretation and recommendations. Do not hesitate to contact us in [facility] if you have questions.    Alfa Dill MD, MSCE  Maternal Fetal Medicine

## 2025-02-11 NOTE — PROGRESS NOTES
Ultrasound Probe Disinfection    A transvaginal ultrasound was performed.   Prior to use, disinfection was performed with High Level Disinfection Process (Squrl).  Probe serial number A [id number] [facility] [id number] was used.    Jeanna Sauceda  02/11/25  10:55 AM

## 2025-02-12 ENCOUNTER — TELEPHONE (OUTPATIENT)
Dept: OBGYN CLINIC | Facility: CLINIC | Age: 35
End: 2025-02-12

## 2025-02-12 NOTE — TELEPHONE ENCOUNTER
2nd trimester phone call - 21 wks 5 days - left message patient's VM & MyChart message sent to patient 2/12/2025    Overall how are you doing?     Compliant with routine OB care appointments? yes    Have you completed your 1st trimester labs? yes    If you had NIPS with MFM, do you have a order for MSAFP? Has active order in chart for MSAFP      Have you seen MFM and do you have your detailed US scheduled? Yes - had detailed US 2/11/2025    Pregnancy Education-have you had a chance to review the classes offered and registered?

## 2025-02-15 NOTE — PROGRESS NOTES
A fetal ultrasound was completed. See Ob procedures in Epic for an interpretation and recommendations. Do not hesitate to contact us in [facility] if you have questions.    Alfa Dill MD, MSCE  Maternal Fetal Medicine

## 2025-02-18 ENCOUNTER — ROUTINE PRENATAL (OUTPATIENT)
Facility: HOSPITAL | Age: 35
End: 2025-02-18
Payer: COMMERCIAL

## 2025-02-18 VITALS
HEIGHT: 64 IN | BODY MASS INDEX: 41.79 KG/M2 | DIASTOLIC BLOOD PRESSURE: 78 MMHG | WEIGHT: 244.8 LBS | OXYGEN SATURATION: 99 % | HEART RATE: 99 BPM | SYSTOLIC BLOOD PRESSURE: 120 MMHG

## 2025-02-18 DIAGNOSIS — Z36.86 ENCOUNTER FOR ANTENATAL SCREENING FOR CERVICAL LENGTH: ICD-10-CM

## 2025-02-18 DIAGNOSIS — Z3A.22 22 WEEKS GESTATION OF PREGNANCY: Primary | ICD-10-CM

## 2025-02-18 PROBLEM — O46.90 VAGINAL BLEEDING IN PREGNANCY: Status: RESOLVED | Noted: 2024-11-29 | Resolved: 2025-02-18

## 2025-02-18 PROBLEM — O99.810 HYPERGLYCEMIA DURING PREGNANCY: Status: ACTIVE | Noted: 2025-02-18

## 2025-02-18 PROBLEM — O36.1920 KELL ISOIMMUNIZATION DURING PREGNANCY IN SECOND TRIMESTER: Status: RESOLVED | Noted: 2024-12-27 | Resolved: 2025-02-18

## 2025-02-18 PROCEDURE — 76815 OB US LIMITED FETUS(S): CPT | Performed by: OBSTETRICS & GYNECOLOGY

## 2025-02-18 PROCEDURE — 76817 TRANSVAGINAL US OBSTETRIC: CPT | Performed by: OBSTETRICS & GYNECOLOGY

## 2025-02-18 PROCEDURE — 99214 OFFICE O/P EST MOD 30 MIN: CPT | Performed by: OBSTETRICS & GYNECOLOGY

## 2025-02-18 NOTE — PROGRESS NOTES
"[facility]: Ashley Noyola was seen today at 22w4d for  transvaginal cervical length and evaluation of amniotic fluid ultrasound .  See ultrasound report under \"OB Procedures\" tab.  Please don't hesitate to contact our office with any concerns or questions.  -Bertha Zamarripa MD      "

## 2025-02-18 NOTE — LETTER
"   Date: 2025    NAKITA Morales  [address]    [address]  [place] [address]    Patient: Ashley Noyola   YOB: 1990   Date of Visit: 2025   Gestational age 22w4d   Nature of this communication: Routine though please note Gestational diabetes is not optimally controlled, she will likely be starting insulin in the near future.        This patient was seen recently in our  office.  Please see ultrasound report under \"OB Procedures\" tab.  Please don't hesitate to contact our office with any concerns or questions.      Sincerely,      Bertha Zamarripa MD  Attending Physician, Maternal-Fetal Medicine  [facility]    "

## 2025-02-18 NOTE — PROGRESS NOTES
Ultrasound Probe Disinfection    A transvaginal ultrasound was performed.   Prior to use, disinfection was performed with High Level Disinfection Process (Recyclebankon).  Probe serial number A2: [id number] was used.    Jeanna Sauceda  02/18/25  9:50 AM

## 2025-02-18 NOTE — LETTER
"   Date: 2025    Tameka Tidwell RD    Patient: Ashley Noyola   YOB: 1990   Date of Visit: 2025   Gestational age 22w4d   Nature of this communication: Priority: You are seeing her tomorrow for class 2. Fasting glucose is in the 120s. I set the expectation she will need insulin and strongly encouraged her to follow-up for her appointment with you tomorrow. Thank you.        This patient was seen recently in our  office.  Please see ultrasound report under \"OB Procedures\" tab.  Please don't hesitate to contact our office with any concerns or questions.      Sincerely,      Bertha Zamarripa MD  Attending Physician, Maternal-Fetal Medicine  [facility]    "

## 2025-02-19 ENCOUNTER — TELEPHONE (OUTPATIENT)
Dept: PERINATAL CARE | Facility: CLINIC | Age: 35
End: 2025-02-19

## 2025-02-19 NOTE — TELEPHONE ENCOUNTER
No Show Documentation  Diabetes and Pregnancy Program    Ashley Noyola was a no show today (02/19/25) for Gestational Diabetes Class 2 and Insulin Education.    Called pt. No Answer. Left VM. Sent O[employer] Message. Pt was instructed to reach out to call [phone] to reschedule.    Luz Dyer RD  Diabetes Educator   [facility]  Diabetes and Pregnancy Program

## 2025-02-19 NOTE — PLAN OF CARE
Problem: POSTPARTUM  Goal: Experiences normal postpartum course  Description  INTERVENTIONS:  - Monitor maternal vital signs  - Assess uterine involution and lochia  Outcome: Progressing  Goal: Appropriate maternal -  bonding  Description  INTERVENTIONS:  - Identify family support  - Assess for appropriate maternal/infant bonding   -Encourage maternal/infant bonding opportunities  - Referral to  or  as needed  Outcome: Progressing  Goal: Establishment of infant feeding pattern  Description  INTERVENTIONS:  - Assess breast/bottle feeding  - Refer to lactation as needed  Outcome: Progressing  Goal: Incision(s), wounds(s) or drain site(s) healing without S/S of infection  Description  INTERVENTIONS  - Assess and document risk factors for skin impairment   - Assess and document dressing, incision, wound bed, drain sites and surrounding tissue  - Consider nutrition services referral as needed  - Oral mucous membranes remain intact  - Provide patient/ family education  Outcome: Progressing     Problem: PAIN - ADULT  Goal: Verbalizes/displays adequate comfort level or baseline comfort level  Description  Interventions:  - Encourage patient to monitor pain and request assistance  - Assess pain using appropriate pain scale   0-10   - Administer analgesics based on type and severity of pain and evaluate response  - Implement non-pharmacological measures as appropriate and evaluate response  - Consider cultural and social influences on pain and pain management  - Notify physician/advanced practitioner if interventions unsuccessful or patient reports new pain   Outcome: Progressing     Problem: INFECTION - ADULT  Goal: Absence or prevention of progression during hospitalization  Description  INTERVENTIONS:  - Assess and monitor for signs and symptoms of infection  - Monitor lab/diagnostic results  - Monitor all insertion sites, i e  indwelling line  - Junction appropriate cooling/warming therapies per order  - Administer medications as ordered  - Instruct and encourage patient and family to use good hand hygiene technique   Outcome: Progressing  Goal: Absence of fever/infection during neutropenic period  Description  INTERVENTIONS:  - Monitor WBC    Outcome: Progressing     Problem: SAFETY ADULT  Goal: Patient will remain free of falls  Description  INTERVENTIONS:  - Assess patient frequently for physical needs  -  Identify cognitive and physical deficits and behaviors that affect risk of falls    -  Buena fall precautions as indicated by assessment   - Educate patient/family on patient safety including physical limitations  - Instruct patient to call for assistance with activity based on assessment  - Modify environment to reduce risk of injury   Outcome: Progressing  Goal: Maintain or return to baseline ADL function  Description  INTERVENTIONS:  -  Assess patient's ability to carry out ADLs; assess patient's baseline for ADL function and identify physical deficits which impact ability to perform ADLs (bathing, care of mouth/teeth, toileting, grooming, dressing, etc )  - Assess/evaluate cause of self-care deficits   - Assess range of motion  - Assess patient's mobility; develop plan if impaired  - Assess patient's need for assistive devices and provide as appropriate  - Encourage maximum independence but intervene and supervise when necessary  - Involve family in performance of ADLs  - Assess for home care needs following discharge   - Provide patient education as appropriate   Outcome: Progressing  Goal: Maintain or return mobility status to optimal level  Description  INTERVENTIONS:  - Assess patient's baseline mobility status (ambulation, transfers, stairs, etc )    - Identify cognitive and physical deficits and behaviors that affect mobility  - Identify mobility aids required to assist with transfers and/or ambulation (gait belt, sit-to-stand, lift, walker, cane, etc )  - Buena fall precautions as indicated by assessment  - Record patient progress and toleration of activity level on Mobility SBAR; progress patient to next Phase/Stage  - Instruct patient to call for assistance with activity based on assessment   Outcome: Progressing     Problem: Knowledge Deficit  Goal: Patient/family/caregiver demonstrates understanding of disease process, treatment plan, medications, and discharge instructions  Description  Complete learning assessment and assess knowledge base    Interventions:  - Provide teaching at level of understanding  - Provide teaching via preferred learning methods  Outcome: Progressing     Problem: DISCHARGE PLANNING  Goal: Discharge to home or other facility with appropriate resources  Description  INTERVENTIONS:  - Identify barriers to discharge w/patient and caregiver  - Arrange for needed discharge resources and transportation as appropriate  - Identify discharge learning needs (meds, wound care, etc )  - Refer to Case Management Department for coordinating discharge planning if the patient needs post-hospital services based on physician/advanced practitioner order or complex needs related to functional status, cognitive ability, or social support system   Outcome: Progressing no concerns

## 2025-02-24 NOTE — PATIENT INSTRUCTIONS
INSULIN EDUCATION    Thank you for attending our Class 2 with insulin education.     For a quick recap on what you learned today you can review the [email or web address] educational video for insulin pens.     Medications are being recommended to decrease the risk of side effects resulting from hyperglycemia in pregnancy including macrosomia,  hypoglycemia, polyhydramnios, pre-term labor and stillbirth.    Insulin Education:  You will receive 5 pens from the pharmacy  Each insulin pen has 300 units in one pen. Can deliver up to 80 units at one time.   When opening a new pen, remove one insulin pen 15 minutes up to 2 hours before administering to bring insulin to room temperature.   Once you open an insulin pen, it is only good for 28 days at room temperature.   Insulin can be titrated every 3-5 days as needed to control blood sugars.  Insulin doses vary as the pregnancy progresses depending on your weekly blood sugars.     Preparing your pen:  Remove pen cap.  Wipe rubber seal with alcohol wipe. Let dry.  Make sure insulin is clear and colorless. Check expiration date. (Do not use if it's cloudy, colored, or had particles or clumps in it.)  Select a new needle each time. Remove tab, connect and twist on top of insulin pen.   Remove outer and inner needle shields that help protect needle. Keep outer shield to recap your used needle once you are done.     Priming, dosing and injecting:  Perform a 2 unit test dose before each injection. Repeat priming steps until you see insulin at tip of needle. (Do not repeat more than 2 times. If after 2 times you do not see insulin at tip of needle, remove and insert a new needle)   Turn your dose knob to the prescribed units. (Do not count clicks, make sure the number and line matches your prescribed dose)  Choose your site. Remember to rotate injection sites and stay away from stretch marks or scars.   Wipe injection site clean with alcohol and let dry completely.   Insert needle  into your skin, press dose knob and inject slowly. Count to 10 before removing needle from skin.     Clean up:  Replace outer needle shield to cover the needle back up. Unscrew capped needle and throw it away in sharps container.  Make sure your sharp container is: heavy duty plastic, puncture-resistant lid, upright and stable, leak resistant, properly labeled.     Hypoglycemia:  Test your blood sugar at 3:00 am for first 3 mornings following insulin start to monitor for hypoglycemia.   Goal range for 2-3 am:  mg/dL.  Treat low blood sugars <60 or <80 using the 15-15 rule  Always have glucose available for hypoglycemia, use 15 by 15 rule. You can find the 15:15 rule in our GDM booklet.     Scheduling:  Non-stress testing two times weekly and NISA testing beginning at 32 weeks gestation. Call [phone] to schedule if not already scheduled.   Ultrasounds every 4 weeks at the [facility]. Call [phone] to schedule if not already scheduled.     Reporting Blood Sugars:  When adding your blood sugar readings to your glucose flow sheet please ADD UNITS of insulin. This will allow our team see what day you started and when you made any possible adjustments. Important for our team to know in regards to making any changes since it takes 3-5 days to see a difference. We would appreciate this and thank you for all you do to communicate with our team.     Blood Sugar Ranges:  Fastin-90 mg/dL   Before meals:  mg/dL  1 hour after the start of each meal: 140 mg/dL or <   2 hours after start of each meal: 120 mg/dL or <  2-3 am:  mg/dL    Continue Recommendations:  Testing Blood Sugars: 4 times daily (fasting and 1 or 2hrs after the start of each meal) - as instructed    Reporting Blood Sugars: via eEye Glucose Flowsheet on a weekly basis until you deliver.  Meal Plan: 3 meals and 3 snacks daily.   Eating every 2 to 3.5 hours during the day.   Be sure to have bedtime snack that  includes at least 15 grams of carbohydrates and 2 to 3 servings of protein.  Minimum of total carbohydrates of 175 grams daily.   Carbohydrates always need to be paired with protein.   Physical Activity: If ok by your OB try adding a 20-30 minute walk in evening after dinner to help keep fasting glucose within range.  Continue follow up with OB and MFM as recommended.  Stay in close contact with diabetes education team.   While sick or feeling ill, follow our sick day guidelines in our GDM booklet.   For travel and dining out tips refer to our GDM booklet.    If you have any questions or concerns, please do not hesitate to call us at [phone].      Tameka Tidwell   Diabetes Educator  The Diabetes and Pregnancy Program   at [facility] - Maternal Fetal Medicine

## 2025-02-24 NOTE — PROGRESS NOTES
"CLASS 2 & Insulin Education - Individual  (virtual visit)    Thank you for referring your patient to [facility] Diabetes and Pregnancy Program.     Ashley Noyola is a  35 y.o. female who presents today unaccompanied for Virtual Regular Visit, Gestational Diabetes, and Patient Education.  Patient is at 23w3d gestation, Estimated Date of Delivery: 6/20/25.     Visit Diagnosis:  Encounter Diagnosis     ICD-10-CM    1. Diet controlled gestational diabetes mellitus (GDM) in second trimester  O24.410       2. 23 weeks gestation of pregnancy  Z3A.23           Reviewed and updated the following from patients medical record: PMH, Problem List, Allergies, and Current Medications.    Labs  GDM LABS: See Class 1 Note    A1C:  No results found for: \"HGBA1C\"     Labs Ordered This Visit: None--Reminded pt of need to get A1c and CMP labs, which were ordered in January    Current Medications:    Current Outpatient Medications:     aspirin 81 mg chewable tablet, Chew 2 tablets (162 mg total) daily Start taking at 12w0d of pregnancy (12/6/24) Do not start before December 6, 2024., Disp: 180 tablet, Rfl: 1    Blood Glucose Monitoring Suppl (OneTouch Verio Reflect) w/Device KIT, Dispense 1 kit per insurance formulary. GDM, Disp: 1 kit, Rfl: 0    glucose blood (OneTouch Verio) test strip, Test 4 times a day. GDM, Disp: 100 strip, Rfl: 5    hydrOXYzine HCL (ATARAX) 10 mg tablet, Take 1 tablet (10 mg total) by mouth every 6 (six) hours as needed for anxiety (Patient not taking: Reported on 10/31/2024), Disp: 30 tablet, Rfl: 0    Lancets (OneTouch Delica Plus [id number]) MISC, Use 4 a day. GDM, Disp: 100 each, Rfl: 5    Prenatal Vit-Fe Fumarate-FA (PRENATAL VITAMINS PO), Take by mouth, Disp: , Rfl:     Vitamin D, Cholecalciferol, 25 MCG (1000 UT) CAPS, TAKE 2 CAPSULES (2,000 UNITS TOTAL) BY MOUTH DAILY AT 2AM, Disp: 180 capsule, Rfl: 1     Anthropometrics:  Ht Readings from Last 1 Encounters:   02/18/25 5' 4\" (1.626 " m)      Wt Readings from Last 3 Encounters:   25 111 kg (244 lb 12.8 oz)   25 111 kg (245 lb 8 oz)   25 110 kg (241 lb 9.6 oz)        Pre-Gravid Wt Pre-Gravid BMI TWG   99.8 kg (220 lb) 37.74 11.2 kg (24 lb 12.8 oz)     Total Pregnancy Weight Gain Recommendations: 5 kg (11 lb)-9 kg (19 lb)   Current Wt Status Compared to Recommendations: Exceeding -- Recommended to maintain wt for remainder of pregnancy    Most Recent Ultrasound Results:  Findings: NML Growth/NISA    BLOOD GLUCOSE MONITORING:   Timing/Frequency of SMB x per day (Fasting, 2 hour after start of each meal)  Goals: (Fasting) 60-95mg/dL // (2hr PP) <120mg/dL  Method of Reporting Blood Sugars: [employer] Glucose Flowsheet    BG LOG:           Review of Blood Glucose Log:   FBG = Not well controlled  Post-Prandial BG =  variable with many missing readings    Due to elevated fasting readings, discussed insulin initiation. Patient was agreeable to this.     Insulin Education:    Lantus 26 units, injected each night at bedtime (10 pm).    The patient was instructed on the following:  Insulin administration times, insulin action.  Hypoglycemia signs, symptoms and treatment. Advised patient to test blood sugar at 3:00 am for first 3 mornings following insulin start to monitor for hypoglycemia  Increase in maternal-fetal surveillance with insulin initiation.  Side effects of hyperglycemia in pregnancy including macrosomia,  hypoglycemia, polyhydramnios, pre-term labor and stillbirth.  Continue to monitor blood glucoses via fingerstick fasting (goal 60 mg/dl to 90 mg/dl) and two hours post prandial (goal less than 120 mg/dl).  Non-stress testing two times weekly and NISA testing beginning at 32 weeks gestation.        Ultrasounds every 4 weeks at the [facility].  HbA1c every 6 to 8 weeks    MEAL PLAN   1800 calorie (CHO: 45-15-45-15-45-30) (PRO:2-1-3-1-3-2)    Review of Patient's Current Diet: refer to class 1 note  for additional details    7A Breakfast: [employer] yogurt (sometimes Greek) OR eggs with treviño   AM Snack: none  12-2P Lunch: fish sandwich OR a burger with fries  PM Snack: none  8P Dinner: baked chicken with green beans, shamar greens, mashed potatoes or corn, and an ice pop 30 minutes later  Bedtime Snack: none  Asleep 10-11P     Beverages: water, Crystal Light, coffee with sweetened creamer    Meal Plan Recommendations Compliant? Comments:    Consistent CHO Intake No     3 Meals and 3 Snacks No     Protein w/ Every Meal and Snack Yes     Eating every 2-3.5hrs while awake  No     8-10hrs Fasting (from time of bedtime snack until first meal of the day) No  - Exceeding 10hrs Fasting Overnight: Reinforced recommended time frame of (8-10hrs) from time of bedtime snack     Overall Impression: Pt has a fair compliance and understanding of diet recommendations at this time.    Reinforced Diet Instructions:  Individualized meal plan.   Importance of consistent carbohydrate intake via 3 meals and 3 snacks per day--especially now with starting insulin   Importance of protein as it relates to blood glucose control.   Encouraged  patient to eat every 2.0-3.5 hours while awake  Encouraged patient to go no longer than 8-10 hours fasting overnight until first meal of the day.  Provided suggested meal/snack options to increase nutrition and maintain consistent meal and snack intakes.  Reminder to avoid sugar sweetened beverages and fried foods    Physical Activity:  Currently physically active?  Walking the dog 30 minutes a day    Reviewed w/ Pt:   Benefits of physical activity to optimize blood glucose control, encouraged activity at patient is physically able.   Instructed pt to always consult a physician prior to starting an exercise program.   Recommend 20-30 minutes daily.    Additional Topics Reviewed:    Medications: (reviewed options available with pt)  Discussed if blood sugars are not within normal range with meal  "planning and exercise  Reviewed medication such as metformin and/or basal/bolus insulin may be needed for better glucose control  Maternal-Fetal Testing:   Ultrasounds: growth scans every 4 weeks.  If anti-diabetic medication started, pt should complete NST x2 weekly and NISA x 1 weekly starting at 32nd week GA unless otherwise instructed per [facility] physician.  Sick day Guidelines:   Advised that sickness will raise blood sugar   If blood sugar is > 160 mg/dL twice in one day call doctor  If on diabetes medications, continue as instructed   If unable to consume normal meal plan, instructed to remain well hydrated   Hypoglycemia & Treatment Guidelines:  Reviewed what hypoglycemia is, signs and symptoms, and how to treat via the 15:15 rule.  Post-Partum Guidelines:  Completion of 75 gm CHO 2 hr gtt at 6 weeks post-partum to check for Type 2 DM diagnosis  Breastfeeding Guidelines:  Continue GDM meal plan plus additional 350-500 calories daily  Examples of protein and carbohydrate snacks provided.  Stay hydrated by drinking 8-10 (8 oz.) fluids daily.  Dining Out & Travel Guidelines:  Patient advised to be prepared with extra diabetes supplies, medications, and snacks, as well as sticking to the same time schedule and portions eaten at home for meals and snacks.    Patient Stated Goal: \"I will eat 3 meals and 3 snacks each day, including protein at each\"  Goal Assessment: Not on track    Diabetes Self Management Support Plan outside of ongoing care: Spouse/Family    Barriers to Learning/Change: No Barriers  Expected Compliance: fair    Date to report blood sugars: Weekly   Follow up:  No follow-ups on file.     Begin Time: 8:30  End Time: 9:30    It was a pleasure working with them today. Please feel free to call ([phone]) with any questions or concerns.    Tameka Tidwell   Diabetes Educator  [facility] Maternal Fetal Medicine  Diabetes and Pregnancy Program  [address], [address]  [place] [address]    Virtual " "Regular Visit  Name: Ashley Noyola      : 1990      MRN: [id number]  Encounter Provider: Tameka Tidwell  Encounter Date: 2025   Encounter department: [facility] [place]  :  Assessment & Plan  Diet controlled gestational diabetes mellitus (GDM) in second trimester    No results found for: \"HGBA1C\"         23 weeks gestation of pregnancy         23 weeks gestation of pregnancy               History of Present Illness     HPI  Review of Systems    Objective   LMP 2024     Physical Exam    Administrative Statements   Encounter provider Tameka Tidwell    The Patient is located at Home and in the following state in which I hold an active license [place].    The patient was identified by name and date of birth. Ashley Noyola was informed that this is a telemedicine visit and that the visit is being conducted through the Epic Embedded platform. She agrees to proceed.  My office door was closed. No one else was in the room.  She acknowledged consent and understanding of privacy and security of the video platform. The patient has agreed to participate and understands they can discontinue the visit at any time.    I have spent a total time of 60 minutes in caring for this patient on the day of the visit/encounter including Counseling / Coordination of care and Documenting in the medical record.    "

## 2025-02-25 ENCOUNTER — TELEMEDICINE (OUTPATIENT)
Dept: PERINATAL CARE | Facility: CLINIC | Age: 35
End: 2025-02-25
Payer: COMMERCIAL

## 2025-02-25 DIAGNOSIS — O24.410 DIET CONTROLLED GESTATIONAL DIABETES MELLITUS (GDM) IN SECOND TRIMESTER: Primary | ICD-10-CM

## 2025-02-25 DIAGNOSIS — Z3A.23 23 WEEKS GESTATION OF PREGNANCY: ICD-10-CM

## 2025-02-25 PROCEDURE — G0108 DIAB MANAGE TRN  PER INDIV: HCPCS

## 2025-02-25 RX ORDER — INSULIN GLARGINE 100 [IU]/ML
INJECTION, SOLUTION SUBCUTANEOUS
Qty: 15 ML | Refills: 0 | Status: SHIPPED | OUTPATIENT
Start: 2025-02-25 | End: 2025-06-20

## 2025-02-25 RX ORDER — INSULIN GLARGINE 100 [IU]/ML
INJECTION, SOLUTION SUBCUTANEOUS
Qty: 15 ML | Refills: 0 | Status: CANCELLED | OUTPATIENT
Start: 2025-02-25 | End: 2025-06-20

## 2025-03-02 DIAGNOSIS — O99.211 OBESITY AFFECTING PREGNANCY IN FIRST TRIMESTER, UNSPECIFIED OBESITY TYPE: ICD-10-CM

## 2025-03-02 DIAGNOSIS — Z34.81 PRENATAL CARE, SUBSEQUENT PREGNANCY, FIRST TRIMESTER: ICD-10-CM

## 2025-03-03 RX ORDER — ASPIRIN 81 MG
TABLET,CHEWABLE ORAL
Qty: 180 TABLET | Refills: 1 | Status: SHIPPED | OUTPATIENT
Start: 2025-03-03

## 2025-03-04 ENCOUNTER — TELEPHONE (OUTPATIENT)
Facility: HOSPITAL | Age: 35
End: 2025-03-04

## 2025-03-04 NOTE — TELEPHONE ENCOUNTER
Pt called to R/S fetal echo initially scheduled for 3/6/25. No available appts, MFM office currently closed to assist with R/S. Please provide a c/b. Thank you.

## 2025-03-05 NOTE — TELEPHONE ENCOUNTER
Left voicemail informing patient we had a 10:15 opening in [place] tomorrow to complete her Fetal Echo. Requested she give our office a call back at [phone] with any questions or if she would need to reschedule.

## 2025-03-07 ENCOUNTER — TREATMENT (OUTPATIENT)
Dept: PERINATAL CARE | Facility: CLINIC | Age: 35
End: 2025-03-07

## 2025-03-07 DIAGNOSIS — O24.410 DIET CONTROLLED GESTATIONAL DIABETES MELLITUS (GDM) IN SECOND TRIMESTER: Primary | ICD-10-CM

## 2025-03-07 DIAGNOSIS — Z3A.25 25 WEEKS GESTATION OF PREGNANCY: ICD-10-CM

## 2025-03-07 NOTE — PROGRESS NOTES
"Ashley Noyola is a 35 y.o. year-old female para  with an BRITTNY of 2025, by Ultrasound at 25w3d weeks gestation.     Diabetes Type: Gestational        Current Outpatient Medications on File Prior to Visit   Medication Sig    Lantus SoloStar 100 units/mL SOPN Inject 26 units once daily at bedtime (10 pm); Titrate as instructed (Patient taking differently: Inject 32 units once daily at bedtime (10 pm); Titrate as instructed)    aspirin (Aspirin Low Dose) 81 mg chewable tablet CHEW 2 TABLETS (162 MG TOTAL) DAILY START TAKING AT 12W0D OF PREGNANCY (24) DO NOT START BEFORE 2024.    Blood Glucose Monitoring Suppl (OneTouch Verio Reflect) w/Device KIT Dispense 1 kit per insurance formulary. GDM    glucose blood (OneTouch Verio) test strip Test 4 times a day. GDM    hydrOXYzine HCL (ATARAX) 10 mg tablet Take 1 tablet (10 mg total) by mouth every 6 (six) hours as needed for anxiety (Patient not taking: Reported on 10/31/2024)    Insulin Pen Needle 31G X 5 MM MISC Use with insulin pen once daily at bedtime    Lancets (OneTouch Delica Plus [id number]) MISC Use 4 a day. GDM    Prenatal Vit-Fe Fumarate-FA (PRENATAL VITAMINS PO) Take by mouth    Vitamin D, Cholecalciferol, 25 MCG (1000 UT) CAPS TAKE 2 CAPSULES (2,000 UNITS TOTAL) BY MOUTH DAILY AT 2AM        Current insulin regimen: Lantus 26 units, daily at bedtime        Most recent A1C: No results found for: \"HGBA1C\"  Goal for A1c for this patient is 5.6% or less. Recommend a repeat hemoglobin A1c Yes . Reminded pt at last visit to get A1c and CMP labs done as soon as able.     Last US date: 25  Findings: AC 82%,  grams - 1 lbs 3 oz (possible macrosomia), Amniotic Fluid normal     Review of recent blood sugars reveals:  Continued fasting glucose elevations with multiple PP elevations         Changes made to regimen today: Increase Lantus from 26 units to 32 units        Patient Instructions Reviewed  Check blood sugars 4 times daily " (fasting and 2 hours after first bite of each meal)  Report blood sugars every week using flowsheet in [employer]   Call office if blood sugar < 60 mg/dL, 160 mg/dL twice in the same day, or <200 mg/dL (after washing hands a second time and retesting)  Goal for blood sugars 60 -120 mg/dl     Tameka Tidwell RDN, LDN  Diabetes Educator

## 2025-03-13 ENCOUNTER — TELEPHONE (OUTPATIENT)
Facility: HOSPITAL | Age: 35
End: 2025-03-13

## 2025-03-13 NOTE — TELEPHONE ENCOUNTER
Message left regarding today's appointment and to call office if needs to reschedule. Also to verify current units of insulin since last increase was to 32 units but 36 units documented. Pending return phone call.

## 2025-03-20 ENCOUNTER — TELEPHONE (OUTPATIENT)
Age: 35
End: 2025-03-20

## 2025-03-30 NOTE — TELEPHONE ENCOUNTER
03/29/25 11:20 PM    The office's request has been received and reviewed. At this time we are unable to remove PCP. The provider is identified as their PCP via RTE and /or  on the insurance card.      The patient must contact their insurance company and update the PCP with them. RTE 3/12/2024.     Bonifacio Mark

## 2025-04-04 ENCOUNTER — ULTRASOUND (OUTPATIENT)
Dept: PERINATAL CARE | Facility: OTHER | Age: 35
End: 2025-04-04
Payer: COMMERCIAL

## 2025-04-04 VITALS
WEIGHT: 252.4 LBS | HEIGHT: 64 IN | HEART RATE: 82 BPM | BODY MASS INDEX: 43.09 KG/M2 | SYSTOLIC BLOOD PRESSURE: 114 MMHG | DIASTOLIC BLOOD PRESSURE: 66 MMHG

## 2025-04-04 DIAGNOSIS — O24.414 INSULIN CONTROLLED GESTATIONAL DIABETES MELLITUS (GDM) IN THIRD TRIMESTER: ICD-10-CM

## 2025-04-04 DIAGNOSIS — Z3A.29 29 WEEKS GESTATION OF PREGNANCY: Primary | ICD-10-CM

## 2025-04-04 PROCEDURE — 76816 OB US FOLLOW-UP PER FETUS: CPT | Performed by: OBSTETRICS & GYNECOLOGY

## 2025-04-04 PROCEDURE — 99213 OFFICE O/P EST LOW 20 MIN: CPT | Performed by: OBSTETRICS & GYNECOLOGY

## 2025-04-04 NOTE — PROGRESS NOTES
The patient was seen today for an ultrasound.  Please see ultrasound report (located under Ob Procedures) for additional details.   Thank you very much for allowing us to participate in the care of this very nice patient.  Should you have any questions, please do not hesitate to contact me.     Nik Alberto MD FACOG  Attending Physician, Maternal-Fetal Medicine  [facility]

## 2025-04-23 ENCOUNTER — TELEPHONE (OUTPATIENT)
Age: 35
End: 2025-04-23

## 2025-04-23 NOTE — TELEPHONE ENCOUNTER
[facility]ft [facility]  [address]   [address]   [place] [address]   Phone: tel:[phone]   fax:[phone]       Ruben hernandez PCP

## 2025-04-28 DIAGNOSIS — Z86.32 HISTORY OF GESTATIONAL DIABETES MELLITUS (GDM), NOT CURRENTLY PREGNANT: ICD-10-CM

## 2025-04-28 DIAGNOSIS — Z13.1 DIABETES MELLITUS SCREENING: Primary | ICD-10-CM

## 2025-04-28 NOTE — TELEPHONE ENCOUNTER
04/28/25 8:09 AM        The office's request has been received, reviewed, and the patient chart updated. The PCP has successfully been removed with a patient attribution note. This message will now be completed.        Thank you  Bonifacio Mark

## 2025-04-28 NOTE — PROGRESS NOTES
Today's Date: 4/28/2025  Pt's Preferred Lab: None Specified  Ambulatory Order    Lab Ordered: 2hr (75GM) GTT   Reason: to screen for T2DM s/p delivery r/t H/O GDM  Time-Frame to be collected: 4-12 weeks postpartum    Patient Instructions: Fasting = Do NOT eat or drink anything except WATER for at least 8 hours before the test.    *For [facility], please call [phone] to schedule.   *To request lab be sent to alternative lab including [facility] or [facility], Call: [phone] or Message Diabetes Team via Cloudcam Message to NAKITA Arriola RD   Diabetes Educator   [facility] - Maternal Fetal Medicine  Diabetes and Pregnancy Program

## (undated) DEVICE — CHLORHEXIDINE 4PCT 4 OZ

## (undated) DEVICE — STRL ALLENTOWN HYSTEROSCOPY PK: Brand: CARDINAL HEALTH

## (undated) DEVICE — D + E SUCTION CANISTER

## (undated) DEVICE — STERILE SURGICAL LUBRICANT,  TUBE: Brand: SURGILUBE

## (undated) DEVICE — PREMIUM DRY TRAY LF: Brand: MEDLINE INDUSTRIES, INC.

## (undated) DEVICE — COLLECTION SET, DISPOSABLE WITH HANDLE AND TAPERED FITTINGS TUBING, 6 FT (183 CM): Brand: GYRUS ACMI

## (undated) DEVICE — SPONGE LAP 18 X 18 IN STRL RFD

## (undated) DEVICE — D + E CONNECTION HOSE

## (undated) DEVICE — LIGHT HANDLE COVER SLEEVE DISP BLUE STELLAR

## (undated) DEVICE — D + E SAFE TOUCH TISSUE TRAP (CIRCON)